# Patient Record
Sex: MALE | Race: WHITE | NOT HISPANIC OR LATINO | Employment: FULL TIME | URBAN - METROPOLITAN AREA
[De-identification: names, ages, dates, MRNs, and addresses within clinical notes are randomized per-mention and may not be internally consistent; named-entity substitution may affect disease eponyms.]

---

## 2017-07-18 ENCOUNTER — OFFICE VISIT (OUTPATIENT)
Dept: URGENT CARE | Facility: CLINIC | Age: 27
End: 2017-07-18
Payer: COMMERCIAL

## 2017-07-18 VITALS
RESPIRATION RATE: 18 BRPM | BODY MASS INDEX: 41.75 KG/M2 | HEIGHT: 73 IN | TEMPERATURE: 98 F | SYSTOLIC BLOOD PRESSURE: 155 MMHG | DIASTOLIC BLOOD PRESSURE: 92 MMHG | WEIGHT: 315 LBS | HEART RATE: 76 BPM | OXYGEN SATURATION: 98 %

## 2017-07-18 DIAGNOSIS — J40 BRONCHITIS: Primary | ICD-10-CM

## 2017-07-18 PROCEDURE — 96372 THER/PROPH/DIAG INJ SC/IM: CPT | Mod: S$GLB,,, | Performed by: EMERGENCY MEDICINE

## 2017-07-18 PROCEDURE — 99203 OFFICE O/P NEW LOW 30 MIN: CPT | Mod: 25,S$GLB,, | Performed by: EMERGENCY MEDICINE

## 2017-07-18 RX ORDER — FLUTICASONE PROPIONATE 50 MCG
1 SPRAY, SUSPENSION (ML) NASAL DAILY
COMMUNITY
End: 2022-07-28

## 2017-07-18 RX ORDER — CODEINE PHOSPHATE AND GUAIFENESIN 10; 100 MG/5ML; MG/5ML
5 SOLUTION ORAL 3 TIMES DAILY PRN
Qty: 240 ML | Refills: 0 | Status: SHIPPED | OUTPATIENT
Start: 2017-07-18 | End: 2017-07-23

## 2017-07-18 RX ORDER — BETAMETHASONE SODIUM PHOSPHATE AND BETAMETHASONE ACETATE 3; 3 MG/ML; MG/ML
6 INJECTION, SUSPENSION INTRA-ARTICULAR; INTRALESIONAL; INTRAMUSCULAR; SOFT TISSUE
Status: COMPLETED | OUTPATIENT
Start: 2017-07-18 | End: 2017-07-18

## 2017-07-18 RX ADMIN — BETAMETHASONE SODIUM PHOSPHATE AND BETAMETHASONE ACETATE 6 MG: 3; 3 INJECTION, SUSPENSION INTRA-ARTICULAR; INTRALESIONAL; INTRAMUSCULAR; SOFT TISSUE at 02:07

## 2017-07-18 NOTE — PROGRESS NOTES
Subjective:       Patient ID: Curtis Moseley is a 26 y.o. male.    Chief Complaint: Cough    PATIENT REPORTS PRODUCTIVE COUGH WITH RED/BLOODY OR GREEN FLUIDS. PATIENT ALSO REPORTS RT SIDED POSTEROLATERAL UPPER BACK PAIN THAT RADIATES TO ANTEROLATERAL UPPER ABD PAIN. PATIENT HAS HX OF BRONCHITIS. PATIENT HAS RECENTLY TRAVELED ON AIRPLANE WITHIN THE [facility]. PATIENT ALSO REPORTS DRINKING AFTER A FRIEND WHO WAS SICK.    3-4 week hx occas mucus prod cough, worse in Am, doing better but states parents concerned, no fever, NOC.  Right chest soreness occas with cough, no rash.      Cough   This is a new problem. The current episode started 1 to 4 weeks ago. The problem occurs constantly. The cough is productive of bloody sputum and productive of brown sputum. Pertinent negatives include no chest pain, chills, ear pain, eye redness, fever, headaches, myalgias, sore throat, shortness of breath or wheezing. The symptoms are aggravated by cold air. Risk factors for lung disease include smoking/tobacco exposure and travel. His past medical history is significant for bronchitis.     Review of Systems   Constitution: Negative for chills, fever and malaise/fatigue.   HENT: Negative for congestion, ear pain, headaches, hoarse voice and sore throat.    Eyes: Negative for discharge and redness.   Cardiovascular: Negative for chest pain, dyspnea on exertion and leg swelling.   Respiratory: Positive for cough and sputum production. Negative for shortness of breath and wheezing.         PATIENT REPORTS SOME BLOODY OR GREEN PRODUCTIVE COUGH SINCE 6/20/17   Musculoskeletal: Positive for back pain. Negative for myalgias.        PAIN TO RIGHT POSTERIOLATERAL UPPER BACK THAT RADIATES TO ANTEROLATERAL ABD.   Gastrointestinal: Positive for abdominal pain. Negative for nausea.        PAIN TO RIGHT POSTERIOLATERAL UPPER BACK THAT RADIATES TO ANTEROLATERAL ABD.       Objective:      Physical Exam   Constitutional: He is oriented to person, place,  and time. He appears well-developed and well-nourished. He is cooperative.  Non-toxic appearance. He does not appear ill. No distress.   HENT:   Head: Normocephalic and atraumatic.   Right Ear: Hearing, tympanic membrane, external ear and ear canal normal.   Left Ear: Hearing, tympanic membrane, external ear and ear canal normal.   Nose: Nose normal. No mucosal edema, rhinorrhea or nasal deformity. No epistaxis. Right sinus exhibits no maxillary sinus tenderness and no frontal sinus tenderness. Left sinus exhibits no maxillary sinus tenderness and no frontal sinus tenderness.   Mouth/Throat: Uvula is midline, oropharynx is clear and moist and mucous membranes are normal. No trismus in the jaw. Normal dentition. No uvula swelling. No posterior oropharyngeal erythema.   Eyes: Conjunctivae and lids are normal. Right eye exhibits no discharge. Left eye exhibits no discharge. No scleral icterus.   Sclera clear bilat   Neck: Trachea normal, normal range of motion, full passive range of motion without pain and phonation normal. Neck supple.   Cardiovascular: Normal rate, regular rhythm, normal heart sounds and normal pulses.    Pulmonary/Chest: Effort normal and breath sounds normal. No respiratory distress.   Chest nontender to palp.   Abdominal: Soft. Normal appearance and bowel sounds are normal. He exhibits no distension, no pulsatile midline mass and no mass. There is no tenderness.   Musculoskeletal: Normal range of motion. He exhibits no edema or deformity.   Neurological: He is alert and oriented to person, place, and time. He exhibits normal muscle tone. Coordination normal.   Skin: Skin is warm, dry and intact. He is not diaphoretic. No pallor.   Psychiatric: He has a normal mood and affect. His speech is normal and behavior is normal. Cognition and memory are normal.   Nursing note and vitals reviewed.      Assessment:       1. Bronchitis        Plan:         Bronchitis  -     betamethasone acetate-betamethasone  sodium phosphate injection 6 mg; Inject 1 mL (6 mg total) into the muscle one time.  -     X-Ray Chest PA And Lateral; Future; Expected date: 07/18/2017  -     guaifenesin-codeine 100-10 mg/5 ml (TUSSI-ORGANIDIN NR)  mg/5 mL syrup; Take 5 mLs by mouth 3 (three) times daily as needed for Cough.  Dispense: 240 mL; Refill: 0      Jhony Peck MD     Radiology Procedure Done: AP & Lat CXR.  Interpretation: No infiltrate/pneumothorax noted

## 2017-07-18 NOTE — PATIENT INSTRUCTIONS
Bronchitis, Viral (Adult)    You have a viral bronchitis. Bronchitis is inflammation and swelling of the lining of the lungs. This is often caused by an infection. Symptoms include a dry, hacking cough that is worse at night. The cough may bring up yellow-green mucus. You may also feel short of breath or wheeze. Other symptoms may include tiredness, chest discomfort, and chills.  Bronchitis that is caused by a virus is not treated with antibiotics. Instead, medicines may be given to help relieve symptoms. Symptoms can last up to 2 weeks, although the cough may last much longer.  This illness is contagious during the first few days and is spread through the air by coughing and sneezing, or by direct contact (touching the sick person and then touching your own eyes, nose, or mouth).  Most viral illnesses resolve within 10 to 14 days with rest and simple home remedies, although they may sometimes last for several weeks.  Home care  · If symptoms are severe, rest at home for the first 2 to 3 days. When you go back to your usual activities, don't let yourself get too tired.  · Do not smoke. Also avoid being exposed to secondhand smoke.  · You may use over-the-counter medicine to control fever or pain, unless another pain medicine was prescribed. (Note: If you have chronic liver or kidney disease or have ever had a stomach ulcer or gastrointestinal bleeding, talk with your healthcare provider before using these medicines. Also talk to your provider if you are taking medicine to prevent blood clots.) Aspirin should never be given to anyone younger than 18 years of age who is ill with a viral infection or fever. It may cause severe liver or brain damage.  · Your appetite may be poor, so a light diet is fine. Avoid dehydration by drinking 6 to 8 glasses of fluids per day (such as water, soft drinks, sports drinks, juices, tea, or soup). Extra fluids will help loosen secretions in the nose and lungs.  · Over-the-counter  cough, cold, and sore-throat medicines will not shorten the length of the illness, but they may help to reduce symptoms. (Note: Do not use decongestants if you have high blood pressure.)  Follow-up care  Follow up with your healthcare provider, or as advised. If you had an X-ray or ECG (electrocardiogram), a specialist will review it. You will be notified of any new findings that may affect your care.  Note: If you are age 65 or older, or if you have a chronic lung disease or condition that affects your immune system, or you smoke, talk to your healthcare provider about having pneumococcal vaccinations and a yearly influenza vaccination (flu shot).  When to seek medical advice  Call your healthcare provider right away if any of these occur:  · Fever of 100.4°F (38°C) or higher  · Coughing up increased amounts of colored sputum  · Weakness, drowsiness, headache, facial pain, ear pain, or a stiff neck  Call 911, or get immediate medical care  Contact emergency services right away if any of these occur:  · Coughing up blood  · Worsening weakness, drowsiness, headache, or stiff neck  · Trouble breathing, wheezing, or pain with breathing  Date Last Reviewed: 9/13/2015  © 7764-2794 [employer]. [address] [place] [address], [place] [address]. All rights reserved. This information is not intended as a substitute for professional medical care. Always follow your healthcare professional's instructions.

## 2019-06-12 ENCOUNTER — OFFICE VISIT (OUTPATIENT)
Dept: OPTOMETRY | Facility: CLINIC | Age: 29
End: 2019-06-12
Payer: COMMERCIAL

## 2019-06-12 DIAGNOSIS — H10.13 ALLERGIC CONJUNCTIVITIS OF BOTH EYES: Primary | ICD-10-CM

## 2019-06-12 DIAGNOSIS — H52.13 MYOPIA OF BOTH EYES WITH ASTIGMATISM: ICD-10-CM

## 2019-06-12 DIAGNOSIS — Z46.0 FITTING AND ADJUSTMENT OF SPECTACLES AND CONTACT LENSES: Primary | ICD-10-CM

## 2019-06-12 DIAGNOSIS — H52.203 MYOPIA OF BOTH EYES WITH ASTIGMATISM: ICD-10-CM

## 2019-06-12 PROCEDURE — 92310 PR CONTACT LENS FITTING (NO CHANGE): ICD-10-PCS | Mod: CSM,,, | Performed by: OPTOMETRIST

## 2019-06-12 PROCEDURE — 99999 PR PBB SHADOW E&M-EST. PATIENT-LVL II: CPT | Mod: PBBFAC,,, | Performed by: OPTOMETRIST

## 2019-06-12 PROCEDURE — 99999 PR PBB SHADOW E&M-EST. PATIENT-LVL II: ICD-10-PCS | Mod: PBBFAC,,, | Performed by: OPTOMETRIST

## 2019-06-12 PROCEDURE — 99499 UNLISTED E&M SERVICE: CPT | Mod: S$GLB,,, | Performed by: OPTOMETRIST

## 2019-06-12 PROCEDURE — 92015 DETERMINE REFRACTIVE STATE: CPT | Mod: S$GLB,,, | Performed by: OPTOMETRIST

## 2019-06-12 PROCEDURE — 92015 PR REFRACTION: ICD-10-PCS | Mod: S$GLB,,, | Performed by: OPTOMETRIST

## 2019-06-12 PROCEDURE — 92004 PR EYE EXAM, NEW PATIENT,COMPREHESV: ICD-10-PCS | Mod: S$GLB,,, | Performed by: OPTOMETRIST

## 2019-06-12 PROCEDURE — 99499 NO LOS: ICD-10-PCS | Mod: S$GLB,,, | Performed by: OPTOMETRIST

## 2019-06-12 PROCEDURE — 92004 COMPRE OPH EXAM NEW PT 1/>: CPT | Mod: S$GLB,,, | Performed by: OPTOMETRIST

## 2019-06-12 PROCEDURE — 92310 CONTACT LENS FITTING OU: CPT | Mod: CSM,,, | Performed by: OPTOMETRIST

## 2019-06-12 NOTE — PROGRESS NOTES
HPI     MAUREEN: 2 years ago    (+)SRx, 2 years old   (+)CLs, thinks the last brand was Acuvue Oasys -- replaces every month   (-)pain, irritation, itching   (-)ocular injuries, surgeries    (-)POHx  (-)FOHx    Last edited by Alix Bolton, OD on 6/12/2019  9:33 AM. (History)            Assessment /Plan     For exam results, see Encounter Report.    Allergic conjunctivitis of both eyes    Myopia of both eyes with astigmatism      1. Educated pt on findings. Recommend OTC zaditor if pt becomes symptomatic. Monitor.   2. Updated SRx. Finalized CL Rx, Acuvue Oasys OD: -3.00, OS: -2.75. Recommend pt to wear CLs for a few days to insure he likes Rx then is okay to order. Monitor yearly.     RTC in 1 year for annual eye exam or prn.

## 2019-09-25 ENCOUNTER — TELEPHONE (OUTPATIENT)
Dept: URGENT CARE | Facility: CLINIC | Age: 29
End: 2019-09-25

## 2019-09-25 ENCOUNTER — OFFICE VISIT (OUTPATIENT)
Dept: URGENT CARE | Facility: CLINIC | Age: 29
End: 2019-09-25
Payer: COMMERCIAL

## 2019-09-25 VITALS
SYSTOLIC BLOOD PRESSURE: 135 MMHG | TEMPERATURE: 97 F | HEIGHT: 73 IN | WEIGHT: 315 LBS | DIASTOLIC BLOOD PRESSURE: 76 MMHG | HEART RATE: 74 BPM | OXYGEN SATURATION: 98 % | BODY MASS INDEX: 41.75 KG/M2 | RESPIRATION RATE: 16 BRPM

## 2019-09-25 DIAGNOSIS — J20.8 ACUTE VIRAL BRONCHITIS: Primary | ICD-10-CM

## 2019-09-25 DIAGNOSIS — R05.9 COUGH: ICD-10-CM

## 2019-09-25 DIAGNOSIS — Z87.891 FORMER SMOKER: ICD-10-CM

## 2019-09-25 DIAGNOSIS — R09.82 POSTNASAL DRIP: ICD-10-CM

## 2019-09-25 PROCEDURE — 71046 XR CHEST PA AND LATERAL: ICD-10-PCS | Mod: S$GLB,,, | Performed by: RADIOLOGY

## 2019-09-25 PROCEDURE — 71046 X-RAY EXAM CHEST 2 VIEWS: CPT | Mod: S$GLB,,, | Performed by: RADIOLOGY

## 2019-09-25 PROCEDURE — 99499 NO LOS: ICD-10-PCS | Mod: S$GLB,,, | Performed by: NURSE PRACTITIONER

## 2019-09-25 PROCEDURE — 99499 UNLISTED E&M SERVICE: CPT | Mod: S$GLB,,, | Performed by: NURSE PRACTITIONER

## 2019-09-25 NOTE — PATIENT INSTRUCTIONS
"Please go directly to [facility] at   Address: [address] [facility], [place] [address]  Hours:   Open ? Closes 8PM        Phone: [phone]    I will call you with results and we will further discuss treatment at that time as needed.                                                           URI   If your condition worsens or fails to improve we recommend that you receive another evaluation at the ER immediately or contact your PCP to discuss your concerns or return here. You must understand that you've received an urgent care treatment only and that you may be released before all your medical problems are known or treated. You the patient will arrange for follouwp care as instructed.   If we discussed that I think your illness is viral, it will not respond to antibiotics and will last 10-14 days.  Flonase (fluticasone) is a nasal spray which is available over the counter and may help with your symptoms.   Zyrtec D, Claritin D or Allegra D can also help with symptoms of congestion and drainage.   If you have hypertension avoid using the "D" which is the decongestant   If you just have drainage you can take plain zyrtec, claritin or allegra   If you just have a congested feeling you can take pseudoephedrine (unless you have high blood pressure) which you have to sign for behind the counter. Do not buy the phenylephrine which is on the shelf as it is not effective   Rest and fluids are also important.   Tylenol or ibuprofen can also be used as directed for pain unless you have an allergy to them or medical condition such as stomach ulcers, kidney or liver disease or blood thinners etc for which you should not be taking these type of medications.   If you are flying in the next few days Afrin nose drops for the airplane flight upon take off and landing may help. Other than at those times refrain from using afrin.             Viral Upper Respiratory Illness (Adult)  You have a viral upper " respiratory illness (URI), which is another term for the common cold. This illness is contagious during the first few days. It is spread through the air by coughing and sneezing. It may also be spread by direct contact (touching the sick person and then touching your own eyes, nose, or mouth). Frequent handwashing will decrease risk of spread. Most viral illnesses go away within 7 to 10 days with rest and simple home remedies. Sometimes the illness may last for several weeks. Antibiotics will not kill a virus, and they are generally not prescribed for this condition.    Home care  · If symptoms are severe, rest at home for the first 2 to 3 days. When you resume activity, don't let yourself get too tired.  · Avoid being exposed to cigarette smoke (yours or others).  · You may use acetaminophen or ibuprofen to control pain and fever, unless another medicine was prescribed. (Note: If you have chronic liver or kidney disease, have ever had a stomach ulcer or gastrointestinal bleeding, or are taking blood-thinning medicines, talk with your healthcare provider before using these medicines.) Aspirin should never be given to anyone under 18 years of age who is ill with a viral infection or fever. It may cause severe liver or brain damage.  · Your appetite may be poor, so a light diet is fine. Avoid dehydration by drinking 6 to 8 glasses of fluids per day (water, soft drinks, juices, tea, or soup). Extra fluids will help loosen secretions in the nose and lungs.  · Over-the-counter cold medicines will not shorten the length of time youre sick, but they may be helpful for the following symptoms: cough, sore throat, and nasal and sinus congestion. (Note: Do not use decongestants if you have high blood pressure.)  Follow-up care  Follow up with your healthcare provider, or as advised.  When to seek medical advice  Call your healthcare provider right away if any of these occur:  · Cough with lots of colored sputum  (mucus)  · Severe headache; face, neck, or ear pain  · Difficulty swallowing due to throat pain  · Fever of 100.4°F (38°C)  Call 911, or get immediate medical care  Call emergency services right away if any of these occur:  · Chest pain, shortness of breath, wheezing, or difficulty breathing  · Coughing up blood  · Inability to swallow due to throat pain  Date Last Reviewed: 9/13/2015  © 5650-5856 [employer]. [address] [place] [address], [place] [address]. All rights reserved. This information is not intended as a substitute for professional medical care. Always follow your healthcare professional's instructions.

## 2019-09-25 NOTE — PROGRESS NOTES
"Subjective:       Patient ID: Curtis Moseley is a 28 y.o. male.    Vitals:    09/25/19 1216   BP: 135/76   Pulse: 74   Resp: 16   Temp: 97.4 °F (36.3 °C)   TempSrc: Tympanic   SpO2: 98%   Weight: (!) 149.7 kg (330 lb)   Height: 6' 1" (1.854 m)       Chief Complaint: Cough      Patient presents with complaint of a post nasal drip with a cough that is productive green mucus expectedly more so at.  States that it does not really bother him during the day.  He did originally have nasal congestion and states that he has some borderline nasal congestion all the time but that is always relieved with Flonase.  He denies any fever or shortness of breath.  He denies any chest pain. He does not have any ear pain or sore throat associated.  Patient states that he knew knee has health insurance.  He also reports that he was previously a vapor smoker but has not smoked for 6 months.    Cough   This is a new problem. The current episode started in the past 7 days. The problem has been unchanged. The problem occurs constantly. The cough is productive of sputum. Associated symptoms include nasal congestion (resolved) and postnasal drip. Pertinent negatives include no chest pain, chills, ear congestion, ear pain, eye redness, fever, headaches, heartburn, hemoptysis, myalgias, rash, rhinorrhea, sore throat, shortness of breath, sweats, weight loss or wheezing. Nothing aggravates the symptoms. Treatments tried: flonase  The treatment provided mild relief. His past medical history is significant for bronchitis and environmental allergies. There is no history of asthma, COPD, emphysema or pneumonia.     Review of Systems   Constitution: Negative for chills, fever, malaise/fatigue and weight loss.   HENT: Positive for congestion and postnasal drip. Negative for ear pain, hoarse voice, rhinorrhea and sore throat.    Eyes: Negative for discharge and redness.   Cardiovascular: Negative for chest pain, dyspnea on exertion and leg swelling. "   Respiratory: Positive for cough and sputum production. Negative for hemoptysis, shortness of breath and wheezing.    Skin: Negative for rash.   Musculoskeletal: Negative for myalgias.   Gastrointestinal: Negative for abdominal pain, heartburn and nausea.   Neurological: Negative for headaches.   Allergic/Immunologic: Positive for environmental allergies.       Objective:      Physical Exam   Constitutional: He is oriented to person, place, and time. He appears well-developed and well-nourished. He is cooperative.  Non-toxic appearance. He does not have a sickly appearance. He does not appear ill. No distress.   HENT:   Head: Normocephalic and atraumatic.   Right Ear: Hearing, tympanic membrane, external ear and ear canal normal.   Left Ear: Hearing, tympanic membrane, external ear and ear canal normal.   Nose: Nose normal. No mucosal edema, rhinorrhea or nasal deformity. No epistaxis. Right sinus exhibits no maxillary sinus tenderness and no frontal sinus tenderness. Left sinus exhibits no maxillary sinus tenderness and no frontal sinus tenderness.   Mouth/Throat: Uvula is midline, oropharynx is clear and moist and mucous membranes are normal. No trismus in the jaw. Normal dentition. No uvula swelling. No oropharyngeal exudate, posterior oropharyngeal edema or posterior oropharyngeal erythema.   Eyes: Conjunctivae and lids are normal. No scleral icterus.   Sclera clear bilat   Neck: Trachea normal, full passive range of motion without pain and phonation normal. Neck supple.   Cardiovascular: Normal rate, regular rhythm, normal heart sounds, intact distal pulses and normal pulses.   Pulmonary/Chest: Effort normal and breath sounds normal. No respiratory distress. He has no wheezes.   Abdominal: Soft. Normal appearance and bowel sounds are normal. He exhibits no distension. There is no tenderness.   Musculoskeletal: Normal range of motion. He exhibits no edema or deformity.   Lymphadenopathy:     He has no cervical  adenopathy.   Neurological: He is alert and oriented to person, place, and time. He exhibits normal muscle tone. Coordination normal.   Skin: Skin is warm, dry and intact. He is not diaphoretic. No pallor.   Psychiatric: He has a normal mood and affect. His speech is normal and behavior is normal. Judgment and thought content normal. Cognition and memory are normal.   Nursing note and vitals reviewed.      X-ray Chest Pa And Lateral    Result Date: 9/25/2019  EXAMINATION: XR CHEST PA AND LATERAL CLINICAL HISTORY: Cough TECHNIQUE: PA and lateral views of the chest were performed. COMPARISON: Prior study dated 18 July 2017 FINDINGS: The trachea is unchanged in position there is stable unremarkable radiographic appearance of the cardiomediastinal shadow.  Both hilar regions are also stable in appearance with no hilar enlargement or radiographic evidence of pulmonary edema demonstrated. Both lungs are fully expanded and appear clear.  The hemidiaphragms are sharply outlined.  The costophrenic angles are acute with no pleural effusion demonstrated.  There is stable appearance of the included osseous structures.     Stable two-view appearance of the chest without evidence of acute pulmonary process. Electronically signed by: Roger Schroeder MD Date:    09/25/2019 Time:    13:17  Assessment:       1. Acute viral bronchitis    2. Cough    3. Former smoker    4. Postnasal drip        Plan:       Curtis was seen today for cough.    Diagnoses and all orders for this visit:    Acute viral bronchitis    Cough  -     X-Ray Chest PA And Lateral; Future    Former smoker  -     X-Ray Chest PA And Lateral; Future    Postnasal drip      Patient Instructions   Please go directly to [facility] at   Address: [facility] [address] [facility], [place] [address]  Hours:   Open ? Closes 8PM        Phone: [phone]    I will call you with results and we will further discuss treatment at that time as needed.                   "                                         URI   If your condition worsens or fails to improve we recommend that you receive another evaluation at the ER immediately or contact your PCP to discuss your concerns or return here. You must understand that you've received an urgent care treatment only and that you may be released before all your medical problems are known or treated. You the patient will arrange for follouwp care as instructed.   If we discussed that I think your illness is viral, it will not respond to antibiotics and will last 10-14 days.  Flonase (fluticasone) is a nasal spray which is available over the counter and may help with your symptoms.   Zyrtec D, Claritin D or Allegra D can also help with symptoms of congestion and drainage.   If you have hypertension avoid using the "D" which is the decongestant   If you just have drainage you can take plain zyrtec, claritin or allegra   If you just have a congested feeling you can take pseudoephedrine (unless you have high blood pressure) which you have to sign for behind the counter. Do not buy the phenylephrine which is on the shelf as it is not effective   Rest and fluids are also important.   Tylenol or ibuprofen can also be used as directed for pain unless you have an allergy to them or medical condition such as stomach ulcers, kidney or liver disease or blood thinners etc for which you should not be taking these type of medications.   If you are flying in the next few days Afrin nose drops for the airplane flight upon take off and landing may help. Other than at those times refrain from using afrin.             Viral Upper Respiratory Illness (Adult)  You have a viral upper respiratory illness (URI), which is another term for the common cold. This illness is contagious during the first few days. It is spread through the air by coughing and sneezing. It may also be spread by direct contact (touching the sick person and then touching your own eyes, nose, " or mouth). Frequent handwashing will decrease risk of spread. Most viral illnesses go away within 7 to 10 days with rest and simple home remedies. Sometimes the illness may last for several weeks. Antibiotics will not kill a virus, and they are generally not prescribed for this condition.    Home care  · If symptoms are severe, rest at home for the first 2 to 3 days. When you resume activity, don't let yourself get too tired.  · Avoid being exposed to cigarette smoke (yours or others).  · You may use acetaminophen or ibuprofen to control pain and fever, unless another medicine was prescribed. (Note: If you have chronic liver or kidney disease, have ever had a stomach ulcer or gastrointestinal bleeding, or are taking blood-thinning medicines, talk with your healthcare provider before using these medicines.) Aspirin should never be given to anyone under 18 years of age who is ill with a viral infection or fever. It may cause severe liver or brain damage.  · Your appetite may be poor, so a light diet is fine. Avoid dehydration by drinking 6 to 8 glasses of fluids per day (water, soft drinks, juices, tea, or soup). Extra fluids will help loosen secretions in the nose and lungs.  · Over-the-counter cold medicines will not shorten the length of time youre sick, but they may be helpful for the following symptoms: cough, sore throat, and nasal and sinus congestion. (Note: Do not use decongestants if you have high blood pressure.)  Follow-up care  Follow up with your healthcare provider, or as advised.  When to seek medical advice  Call your healthcare provider right away if any of these occur:  · Cough with lots of colored sputum (mucus)  · Severe headache; face, neck, or ear pain  · Difficulty swallowing due to throat pain  · Fever of 100.4°F (38°C)  Call 911, or get immediate medical care  Call emergency services right away if any of these occur:  · Chest pain, shortness of breath, wheezing, or difficulty  breathing  · Coughing up blood  · Inability to swallow due to throat pain  Date Last Reviewed: 9/13/2015  © 1644-0972 [employer]. [address] [place] [address], [place] [address]. All rights reserved. This information is not intended as a substitute for professional medical care. Always follow your healthcare professional's instructions.

## 2019-09-25 NOTE — TELEPHONE ENCOUNTER
I advised the patient on negative chest x-ray with viral bronchitis URI.  Continue with plan of care of Flonase, Zyrtec D and Mucinex DM.  He was advised to follow up closely if he has any worsening symptoms, symptoms past 2-3 weeks, fever, shortness of breath, chest pain.

## 2019-11-10 DIAGNOSIS — R21 RASH AND NONSPECIFIC SKIN ERUPTION: Primary | ICD-10-CM

## 2019-11-10 RX ORDER — CLOTRIMAZOLE AND BETAMETHASONE DIPROPIONATE 10; .64 MG/G; MG/G
CREAM TOPICAL 2 TIMES DAILY
Qty: 45 G | Refills: 1 | Status: SHIPPED | OUTPATIENT
Start: 2019-11-10 | End: 2019-11-20

## 2020-11-05 ENCOUNTER — PATIENT MESSAGE (OUTPATIENT)
Dept: INTERNAL MEDICINE | Facility: CLINIC | Age: 30
End: 2020-11-05

## 2020-11-05 ENCOUNTER — LAB VISIT (OUTPATIENT)
Dept: LAB | Facility: OTHER | Age: 30
End: 2020-11-05
Attending: INTERNAL MEDICINE
Payer: COMMERCIAL

## 2020-11-05 ENCOUNTER — OFFICE VISIT (OUTPATIENT)
Dept: INTERNAL MEDICINE | Facility: CLINIC | Age: 30
End: 2020-11-05
Attending: INTERNAL MEDICINE
Payer: COMMERCIAL

## 2020-11-05 VITALS
HEIGHT: 73 IN | DIASTOLIC BLOOD PRESSURE: 78 MMHG | SYSTOLIC BLOOD PRESSURE: 118 MMHG | HEART RATE: 62 BPM | BODY MASS INDEX: 41.75 KG/M2 | OXYGEN SATURATION: 97 % | WEIGHT: 315 LBS

## 2020-11-05 DIAGNOSIS — Z00.00 ROUTINE ADULT HEALTH MAINTENANCE: ICD-10-CM

## 2020-11-05 DIAGNOSIS — J34.89 RHINORRHEA: ICD-10-CM

## 2020-11-05 DIAGNOSIS — E55.9 VITAMIN D DEFICIENCY: ICD-10-CM

## 2020-11-05 DIAGNOSIS — R09.81 NASAL CONGESTION: ICD-10-CM

## 2020-11-05 DIAGNOSIS — E78.9 DISORDER OF LIPID METABOLISM: ICD-10-CM

## 2020-11-05 DIAGNOSIS — R79.89 OTHER SPECIFIED ABNORMAL FINDINGS OF BLOOD CHEMISTRY: ICD-10-CM

## 2020-11-05 DIAGNOSIS — E03.9 HYPOTHYROIDISM, UNSPECIFIED TYPE: Primary | ICD-10-CM

## 2020-11-05 DIAGNOSIS — J30.1 NON-SEASONAL ALLERGIC RHINITIS DUE TO POLLEN: ICD-10-CM

## 2020-11-05 DIAGNOSIS — Z13.31 SCREENING FOR DEPRESSION: ICD-10-CM

## 2020-11-05 DIAGNOSIS — D51.0 PERNICIOUS ANEMIA: ICD-10-CM

## 2020-11-05 DIAGNOSIS — Z13.89 SCREENING FOR OBESITY: ICD-10-CM

## 2020-11-05 DIAGNOSIS — E03.9 HYPOTHYROIDISM, UNSPECIFIED TYPE: ICD-10-CM

## 2020-11-05 DIAGNOSIS — R09.82 PND (POST-NASAL DRIP): ICD-10-CM

## 2020-11-05 DIAGNOSIS — Z13.39 SCREENING FOR ALCOHOLISM: ICD-10-CM

## 2020-11-05 DIAGNOSIS — D50.8 OTHER IRON DEFICIENCY ANEMIA: ICD-10-CM

## 2020-11-05 DIAGNOSIS — Z12.5 SCREENING FOR PROSTATE CANCER: ICD-10-CM

## 2020-11-05 DIAGNOSIS — Z00.00 ROUTINE ADULT HEALTH MAINTENANCE: Primary | ICD-10-CM

## 2020-11-05 DIAGNOSIS — U07.1 COVID-19: ICD-10-CM

## 2020-11-05 LAB
25(OH)D3+25(OH)D2 SERPL-MCNC: 14 NG/ML (ref 30–96)
ALBUMIN SERPL BCP-MCNC: 4.2 G/DL (ref 3.5–5.2)
ALP SERPL-CCNC: 57 U/L (ref 55–135)
ALT SERPL W/O P-5'-P-CCNC: 55 U/L (ref 10–44)
ANION GAP SERPL CALC-SCNC: 12 MMOL/L (ref 8–16)
AST SERPL-CCNC: 28 U/L (ref 10–40)
BASOPHILS # BLD AUTO: 0.03 K/UL (ref 0–0.2)
BASOPHILS NFR BLD: 0.4 % (ref 0–1.9)
BILIRUB SERPL-MCNC: 0.7 MG/DL (ref 0.1–1)
BUN SERPL-MCNC: 12 MG/DL (ref 6–20)
CALCIUM SERPL-MCNC: 9 MG/DL (ref 8.7–10.5)
CHLORIDE SERPL-SCNC: 103 MMOL/L (ref 95–110)
CHOLEST SERPL-MCNC: 161 MG/DL (ref 120–199)
CHOLEST/HDLC SERPL: 4.1 {RATIO} (ref 2–5)
CO2 SERPL-SCNC: 24 MMOL/L (ref 23–29)
CREAT SERPL-MCNC: 1.2 MG/DL (ref 0.5–1.4)
DIFFERENTIAL METHOD: NORMAL
EOSINOPHIL # BLD AUTO: 0.1 K/UL (ref 0–0.5)
EOSINOPHIL NFR BLD: 1.4 % (ref 0–8)
ERYTHROCYTE [DISTWIDTH] IN BLOOD BY AUTOMATED COUNT: 12.4 % (ref 11.5–14.5)
EST. GFR  (AFRICAN AMERICAN): >60 ML/MIN/1.73 M^2
EST. GFR  (NON AFRICAN AMERICAN): >60 ML/MIN/1.73 M^2
GLUCOSE SERPL-MCNC: 89 MG/DL (ref 70–110)
HCT VFR BLD AUTO: 47.9 % (ref 40–54)
HDLC SERPL-MCNC: 39 MG/DL (ref 40–75)
HDLC SERPL: 24.2 % (ref 20–50)
HGB BLD-MCNC: 16.8 G/DL (ref 14–18)
IMM GRANULOCYTES # BLD AUTO: 0.01 K/UL (ref 0–0.04)
IMM GRANULOCYTES NFR BLD AUTO: 0.1 % (ref 0–0.5)
LDLC SERPL CALC-MCNC: 95.6 MG/DL (ref 63–159)
LYMPHOCYTES # BLD AUTO: 1.9 K/UL (ref 1–4.8)
LYMPHOCYTES NFR BLD: 27.7 % (ref 18–48)
MCH RBC QN AUTO: 30.2 PG (ref 27–31)
MCHC RBC AUTO-ENTMCNC: 35.1 G/DL (ref 32–36)
MCV RBC AUTO: 86 FL (ref 82–98)
MONOCYTES # BLD AUTO: 0.6 K/UL (ref 0.3–1)
MONOCYTES NFR BLD: 9.3 % (ref 4–15)
NEUTROPHILS # BLD AUTO: 4.2 K/UL (ref 1.8–7.7)
NEUTROPHILS NFR BLD: 61.1 % (ref 38–73)
NONHDLC SERPL-MCNC: 122 MG/DL
NRBC BLD-RTO: 0 /100 WBC
PLATELET # BLD AUTO: 216 K/UL (ref 150–350)
PMV BLD AUTO: 11.4 FL (ref 9.2–12.9)
POTASSIUM SERPL-SCNC: 4 MMOL/L (ref 3.5–5.1)
PROT SERPL-MCNC: 7.2 G/DL (ref 6–8.4)
RBC # BLD AUTO: 5.56 M/UL (ref 4.6–6.2)
SODIUM SERPL-SCNC: 139 MMOL/L (ref 136–145)
T4 FREE SERPL-MCNC: 1.07 NG/DL (ref 0.71–1.51)
TRIGL SERPL-MCNC: 132 MG/DL (ref 30–150)
TSH SERPL DL<=0.005 MIU/L-ACNC: 1.67 UIU/ML (ref 0.4–4)
VIT B12 SERPL-MCNC: 304 PG/ML (ref 210–950)
WBC # BLD AUTO: 6.9 K/UL (ref 3.9–12.7)

## 2020-11-05 PROCEDURE — 90715 TDAP VACCINE GREATER THAN OR EQUAL TO 7YO IM: ICD-10-PCS | Mod: S$GLB,,, | Performed by: INTERNAL MEDICINE

## 2020-11-05 PROCEDURE — 83036 HEMOGLOBIN GLYCOSYLATED A1C: CPT

## 2020-11-05 PROCEDURE — 82607 VITAMIN B-12: CPT

## 2020-11-05 PROCEDURE — 84439 ASSAY OF FREE THYROXINE: CPT

## 2020-11-05 PROCEDURE — 86703 HIV-1/HIV-2 1 RESULT ANTBDY: CPT

## 2020-11-05 PROCEDURE — 85025 COMPLETE CBC W/AUTO DIFF WBC: CPT

## 2020-11-05 PROCEDURE — 90715 TDAP VACCINE 7 YRS/> IM: CPT | Mod: S$GLB,,, | Performed by: INTERNAL MEDICINE

## 2020-11-05 PROCEDURE — 80061 LIPID PANEL: CPT

## 2020-11-05 PROCEDURE — 86803 HEPATITIS C AB TEST: CPT

## 2020-11-05 PROCEDURE — 99385 PR PREVENTIVE VISIT,NEW,18-39: ICD-10-PCS | Mod: 25,S$GLB,, | Performed by: INTERNAL MEDICINE

## 2020-11-05 PROCEDURE — 86769 SARS-COV-2 COVID-19 ANTIBODY: CPT

## 2020-11-05 PROCEDURE — 90471 IMMUNIZATION ADMIN: CPT | Mod: S$GLB,,, | Performed by: INTERNAL MEDICINE

## 2020-11-05 PROCEDURE — 99385 PREV VISIT NEW AGE 18-39: CPT | Mod: 25,S$GLB,, | Performed by: INTERNAL MEDICINE

## 2020-11-05 PROCEDURE — 80053 COMPREHEN METABOLIC PANEL: CPT

## 2020-11-05 PROCEDURE — 84443 ASSAY THYROID STIM HORMONE: CPT

## 2020-11-05 PROCEDURE — 82306 VITAMIN D 25 HYDROXY: CPT

## 2020-11-05 PROCEDURE — 90471 TDAP VACCINE GREATER THAN OR EQUAL TO 7YO IM: ICD-10-PCS | Mod: S$GLB,,, | Performed by: INTERNAL MEDICINE

## 2020-11-05 RX ORDER — LEVOCETIRIZINE DIHYDROCHLORIDE 5 MG/1
5 TABLET, FILM COATED ORAL NIGHTLY PRN
COMMUNITY

## 2020-11-05 RX ORDER — MONTELUKAST SODIUM 10 MG/1
10 TABLET ORAL NIGHTLY
Qty: 30 TABLET | Refills: 11 | Status: SHIPPED | OUTPATIENT
Start: 2020-11-05 | End: 2020-12-05

## 2020-11-05 NOTE — PATIENT INSTRUCTIONS
Tips for Healthy Living and Routine Preventative Care - 2020                                                            (These guidelines are intended for healthy adults)      1. Exercise  Exercise aerobically with a target heart rate of (220-age) x 0.8  Exercise 30-45 minutes on most days of the week    2. Diet and Supplements- All supplements can be obtained through a varied, healthy diet   Calcium: 1,000 - 1,200 mg each day   8 oz milk, Calcium fortified O.J., or Yogurt = 300 mg, 1oz of cheese =100-200 mg  Vitamin D: 800 iu each day- Can probably be obtained by 30 min. of direct sunlight    each day             3 oz. [place] = 800 iu,  3 oz. Tuna =150 iu, Milk or fortified O.J. = 120 iu  Fish oil: 1-2 grams each day or about 840 mg of EPA and DHA (Omega-3 fatty acids) each day             3 oz. [place]=2 grams,  3 oz. Tuna=1.3 grams,  3 oz. drained light Tuna= 0.25 grams  Folic acid 800 mcg each day for all women planning or capable of pregnancy    3. Lifestyle  Alcohol: 1 drink = 12 oz. domestic beer, 4 oz. wine, or 1 oz. hard (80 proof) liquor             Males: </= 14 drinks per week with no more than 4 in any one day             Females: </= 7 drinks per week with no more than 3 in any one day  Salt: 1.2 - 3 grams of Sodium each day.  Tobacco: Dont smoke, or quit smoking (discuss with your doctor)  Depression: If you feel depressed discuss with your doctor  Weight: Maintain a healthy body weight. Stay within 10% of:             Males: 106 lbs. + 6 lbs per inch height above 5 feet             Females: 100 lbs + 5 lbs per inch height above 5 feet    4. Routine tests  Blood pressure check at each visit, or at least once each year  HIV screening (one time) if less than 65 years old  Hepatitis C screen (one time) between the age of 18 and 79  Cholesterol screening every 3 years starting at age 21  Glucose check every 2-3 years starting at age 45  TSH (thyroid screen) every 2 years starting at age 50  Colonoscopy at  age 50, and repeat every 10 years until age 75  Vision screen at age 65    Females:   Gyn exam with cervical HPV test every 3 years or Pap smear every three years starting at age 25                  Stop screening at age 65 if past 3 exams were normal                  No screening for women who have had a hysterectomy with removal of cervix  Mammogram every 1-2 years starting at age 40 (or age 50) until age 75.  Bone density scan at about age 65      Males:  Consider PSA screening annually at age 50, age 45 for  Americans, until age 75                 5. Immunizations  Influenza vaccine every year in the fall, especially if >50 or with a chronic disease  Tetnus/Diptheria/Pertusis (Tdap) vaccine once (after the age of 18), then Tetnus/Diptheria (Td) vaccine every 10 years  Shingles (Shingrix) vaccine after age 50 and get a 2nd dose after 2-6 months  Pneumonia vaccine at age 65. 2nd Pneumonia vaccine at least 1 year later (1st should be Prevnar-13, and 2nd should be Pneumovax-23).

## 2020-11-06 PROBLEM — J30.1 NON-SEASONAL ALLERGIC RHINITIS DUE TO POLLEN: Status: ACTIVE | Noted: 2020-11-06

## 2020-11-06 PROBLEM — J40 BRONCHITIS: Status: RESOLVED | Noted: 2017-07-18 | Resolved: 2020-11-06

## 2020-11-06 LAB
ESTIMATED AVG GLUCOSE: 100 MG/DL (ref 68–131)
HBA1C MFR BLD HPLC: 5.1 % (ref 4–5.6)
HCV AB SERPL QL IA: NEGATIVE
HIV 1+2 AB+HIV1 P24 AG SERPL QL IA: NEGATIVE
SARS-COV-2 IGG SERPLBLD QL IA.RAPID: NEGATIVE

## 2020-11-06 NOTE — PROGRESS NOTES
Subjective:       Patient ID: Curtis Moseley is a 29 y.o. male.    Chief Complaint: Annual Exam and Sinus Problem (PND)    Jorge Alberto ross.  Last seen over 5 years ago.  He is living in the [facility] again and working at [employer].  He has problems with a chronic throaty cough.  He does feel like there is some nasal drip.  He denies any GERD.    Sinus Problem  This is a chronic problem. The current episode started more than 1 month ago. The problem is unchanged. There has been no fever. Associated symptoms include congestion.     Review of Systems   Constitutional: Negative.    HENT: Positive for nasal congestion, postnasal drip and rhinorrhea.    Eyes: Negative.    Respiratory: Negative.    Cardiovascular: Negative.    Gastrointestinal: Negative.    Musculoskeletal: Negative.    Neurological: Negative.    Psychiatric/Behavioral: Negative for dysphoric mood.         Objective:      Physical Exam  Vitals signs and nursing note reviewed.   Constitutional:       General: He is not in acute distress.     Appearance: He is well-developed. He is not diaphoretic.   HENT:      Head: Normocephalic.      Right Ear: Ear canal and external ear normal.      Left Ear: Ear canal and external ear normal.      Nose: Nose normal.      Mouth/Throat:      Pharynx: No oropharyngeal exudate.   Eyes:      General: No scleral icterus.        Right eye: No discharge.         Left eye: No discharge.      Conjunctiva/sclera: Conjunctivae normal.      Pupils: Pupils are equal, round, and reactive to light.   Neck:      Musculoskeletal: Normal range of motion and neck supple.      Thyroid: No thyromegaly.      Vascular: No JVD.   Cardiovascular:      Rate and Rhythm: Normal rate and regular rhythm.      Heart sounds: Normal heart sounds. No murmur. No friction rub. No gallop.    Pulmonary:      Effort: Pulmonary effort is normal. No respiratory distress.      Breath sounds: Normal breath sounds. No stridor. No wheezing or rales.   Chest:       Chest wall: No tenderness.   Abdominal:      General: Bowel sounds are normal. There is no distension.      Palpations: Abdomen is soft. There is no mass.      Tenderness: There is no abdominal tenderness. There is no guarding or rebound.   Musculoskeletal: Normal range of motion.         General: No tenderness.   Lymphadenopathy:      Cervical: No cervical adenopathy.   Skin:     General: Skin is warm and dry.      Findings: No rash.   Neurological:      Mental Status: He is alert and oriented to person, place, and time.      Cranial Nerves: No cranial nerve deficit.      Coordination: Coordination normal.      Deep Tendon Reflexes: Reflexes are normal and symmetric. Reflexes normal.   Psychiatric:         Behavior: Behavior normal.         Assessment:       1. Routine adult health maintenance    2. Rhinorrhea    3. PND (post-nasal drip)    4. Nasal congestion    5. Non-seasonal allergic rhinitis due to pollen    6. Screening for alcoholism    7. Screening for obesity    8. Screening for depression        Plan:       Per orders and D/C instructions.     he will try singular log are and or Xyzal for his allergy symptoms.  Check labs.    Screening: The patient was screened for depression with the PHQ2 questionnaire and possible health consequences were discussed with the patient, who understands (15 minutes spent). The patient was screened for the misuse of alcohol, by asking the number of drinks per average week, and if pt has had more than 4 drinks (more than 3 for women and elderly) in 1 day within the past year. The health and legal consequences of misuse were discussed (15 minutes spent). The patient was screened for obesity (BMI>30), If the current BMI > 30, then the possible consequences of obesity, as well as the benefits of diet, exercise, and weight loss were discussed. Any behavioral risks were identified, and methods to achieve appropriate treatment goals were discussed (15 minutes spent).

## 2020-12-15 ENCOUNTER — IMMUNIZATION (OUTPATIENT)
Dept: INTERNAL MEDICINE | Facility: CLINIC | Age: 30
End: 2020-12-15
Payer: COMMERCIAL

## 2020-12-15 DIAGNOSIS — Z23 NEED FOR VACCINATION: ICD-10-CM

## 2020-12-15 PROCEDURE — 91300 COVID-19, MRNA, LNP-S, PF, 30 MCG/0.3 ML DOSE VACCINE: CPT | Mod: ,,, | Performed by: INTERNAL MEDICINE

## 2020-12-15 PROCEDURE — 91300 COVID-19, MRNA, LNP-S, PF, 30 MCG/0.3 ML DOSE VACCINE: ICD-10-PCS | Mod: ,,, | Performed by: INTERNAL MEDICINE

## 2020-12-15 PROCEDURE — 0001A COVID-19, MRNA, LNP-S, PF, 30 MCG/0.3 ML DOSE VACCINE: ICD-10-PCS | Mod: CV19,,, | Performed by: INTERNAL MEDICINE

## 2020-12-15 PROCEDURE — 0001A COVID-19, MRNA, LNP-S, PF, 30 MCG/0.3 ML DOSE VACCINE: CPT | Mod: CV19,,, | Performed by: INTERNAL MEDICINE

## 2021-01-05 ENCOUNTER — IMMUNIZATION (OUTPATIENT)
Dept: INTERNAL MEDICINE | Facility: CLINIC | Age: 31
End: 2021-01-05
Payer: COMMERCIAL

## 2021-01-05 DIAGNOSIS — Z23 NEED FOR VACCINATION: ICD-10-CM

## 2021-01-05 PROCEDURE — 91300 COVID-19, MRNA, LNP-S, PF, 30 MCG/0.3 ML DOSE VACCINE: CPT | Mod: PBBFAC | Performed by: INTERNAL MEDICINE

## 2021-01-05 PROCEDURE — 0002A COVID-19, MRNA, LNP-S, PF, 30 MCG/0.3 ML DOSE VACCINE: CPT | Mod: PBBFAC | Performed by: INTERNAL MEDICINE

## 2021-05-05 ENCOUNTER — OFFICE VISIT (OUTPATIENT)
Dept: INTERNAL MEDICINE | Facility: CLINIC | Age: 31
End: 2021-05-05
Attending: INTERNAL MEDICINE
Payer: COMMERCIAL

## 2021-05-05 ENCOUNTER — PATIENT MESSAGE (OUTPATIENT)
Dept: INTERNAL MEDICINE | Facility: CLINIC | Age: 31
End: 2021-05-05

## 2021-05-05 VITALS
SYSTOLIC BLOOD PRESSURE: 120 MMHG | DIASTOLIC BLOOD PRESSURE: 80 MMHG | OXYGEN SATURATION: 97 % | HEART RATE: 73 BPM | WEIGHT: 315 LBS | BODY MASS INDEX: 41.75 KG/M2 | HEIGHT: 73 IN

## 2021-05-05 DIAGNOSIS — R05.9 COUGH: ICD-10-CM

## 2021-05-05 DIAGNOSIS — E66.01 CLASS 3 SEVERE OBESITY DUE TO EXCESS CALORIES WITHOUT SERIOUS COMORBIDITY WITH BODY MASS INDEX (BMI) OF 45.0 TO 49.9 IN ADULT: ICD-10-CM

## 2021-05-05 DIAGNOSIS — Z13.89 SCREENING FOR OBESITY: ICD-10-CM

## 2021-05-05 DIAGNOSIS — R74.01 ELEVATED TRANSAMINASE LEVEL: ICD-10-CM

## 2021-05-05 DIAGNOSIS — Z13.31 SCREENING FOR DEPRESSION: ICD-10-CM

## 2021-05-05 DIAGNOSIS — E55.9 VITAMIN D DEFICIENCY: ICD-10-CM

## 2021-05-05 DIAGNOSIS — J30.1 NON-SEASONAL ALLERGIC RHINITIS DUE TO POLLEN: Primary | ICD-10-CM

## 2021-05-05 DIAGNOSIS — Z13.39 SCREENING FOR ALCOHOLISM: ICD-10-CM

## 2021-05-05 PROBLEM — E66.813 CLASS 3 SEVERE OBESITY DUE TO EXCESS CALORIES WITHOUT SERIOUS COMORBIDITY WITH BODY MASS INDEX (BMI) OF 45.0 TO 49.9 IN ADULT: Status: ACTIVE | Noted: 2021-05-05

## 2021-05-05 PROCEDURE — G0442 PR  ALCOHOL SCREENING: ICD-10-PCS | Mod: 59,S$GLB,, | Performed by: INTERNAL MEDICINE

## 2021-05-05 PROCEDURE — G0447 PR OBESITY COUNSELING: ICD-10-PCS | Mod: 59,S$GLB,, | Performed by: INTERNAL MEDICINE

## 2021-05-05 PROCEDURE — G0442 ANNUAL ALCOHOL SCREEN 15 MIN: HCPCS | Mod: 59,S$GLB,, | Performed by: INTERNAL MEDICINE

## 2021-05-05 PROCEDURE — 99214 OFFICE O/P EST MOD 30 MIN: CPT | Mod: S$GLB,,, | Performed by: INTERNAL MEDICINE

## 2021-05-05 PROCEDURE — G0447 BEHAVIOR COUNSEL OBESITY 15M: HCPCS | Mod: 59,S$GLB,, | Performed by: INTERNAL MEDICINE

## 2021-05-05 PROCEDURE — 3008F BODY MASS INDEX DOCD: CPT | Mod: CPTII,S$GLB,, | Performed by: INTERNAL MEDICINE

## 2021-05-05 PROCEDURE — 3008F PR BODY MASS INDEX (BMI) DOCUMENTED: ICD-10-PCS | Mod: CPTII,S$GLB,, | Performed by: INTERNAL MEDICINE

## 2021-05-05 PROCEDURE — G0444 PR DEPRESSION SCREENING: ICD-10-PCS | Mod: 59,S$GLB,, | Performed by: INTERNAL MEDICINE

## 2021-05-05 PROCEDURE — 99214 PR OFFICE/OUTPT VISIT, EST, LEVL IV, 30-39 MIN: ICD-10-PCS | Mod: S$GLB,,, | Performed by: INTERNAL MEDICINE

## 2021-05-05 PROCEDURE — G0444 DEPRESSION SCREEN ANNUAL: HCPCS | Mod: 59,S$GLB,, | Performed by: INTERNAL MEDICINE

## 2021-05-05 RX ORDER — VIT C/E/ZN/COPPR/LUTEIN/ZEAXAN 250MG-90MG
2000 CAPSULE ORAL DAILY
COMMUNITY

## 2021-05-05 RX ORDER — AZELASTINE 1 MG/ML
2 SPRAY, METERED NASAL 2 TIMES DAILY PRN
Qty: 30 ML | Refills: 5 | Status: SHIPPED | OUTPATIENT
Start: 2021-05-05 | End: 2022-07-28

## 2021-11-10 ENCOUNTER — LAB VISIT (OUTPATIENT)
Dept: LAB | Facility: OTHER | Age: 31
End: 2021-11-10
Attending: INTERNAL MEDICINE
Payer: COMMERCIAL

## 2021-11-10 ENCOUNTER — OFFICE VISIT (OUTPATIENT)
Dept: INTERNAL MEDICINE | Facility: CLINIC | Age: 31
End: 2021-11-10
Attending: INTERNAL MEDICINE
Payer: COMMERCIAL

## 2021-11-10 VITALS
SYSTOLIC BLOOD PRESSURE: 122 MMHG | DIASTOLIC BLOOD PRESSURE: 78 MMHG | WEIGHT: 315 LBS | HEART RATE: 93 BPM | OXYGEN SATURATION: 97 % | HEIGHT: 73 IN | BODY MASS INDEX: 41.75 KG/M2

## 2021-11-10 DIAGNOSIS — E78.9 DISORDER OF LIPID METABOLISM: ICD-10-CM

## 2021-11-10 DIAGNOSIS — R79.89 OTHER SPECIFIED ABNORMAL FINDINGS OF BLOOD CHEMISTRY: ICD-10-CM

## 2021-11-10 DIAGNOSIS — E66.01 CLASS 3 SEVERE OBESITY DUE TO EXCESS CALORIES WITHOUT SERIOUS COMORBIDITY WITH BODY MASS INDEX (BMI) OF 45.0 TO 49.9 IN ADULT: Primary | ICD-10-CM

## 2021-11-10 DIAGNOSIS — E03.9 HYPOTHYROIDISM, UNSPECIFIED TYPE: ICD-10-CM

## 2021-11-10 DIAGNOSIS — Z12.5 SCREENING FOR PROSTATE CANCER: ICD-10-CM

## 2021-11-10 DIAGNOSIS — D51.0 PERNICIOUS ANEMIA: ICD-10-CM

## 2021-11-10 DIAGNOSIS — Z00.00 ROUTINE ADULT HEALTH MAINTENANCE: Primary | ICD-10-CM

## 2021-11-10 DIAGNOSIS — D50.8 OTHER IRON DEFICIENCY ANEMIA: ICD-10-CM

## 2021-11-10 DIAGNOSIS — E55.9 VITAMIN D DEFICIENCY: ICD-10-CM

## 2021-11-10 DIAGNOSIS — R74.01 ELEVATED TRANSAMINASE LEVEL: ICD-10-CM

## 2021-11-10 DIAGNOSIS — Z00.00 ROUTINE ADULT HEALTH MAINTENANCE: ICD-10-CM

## 2021-11-10 LAB
25(OH)D3+25(OH)D2 SERPL-MCNC: 21 NG/ML (ref 30–96)
ALBUMIN SERPL BCP-MCNC: 3.7 G/DL (ref 3.5–5.2)
ALP SERPL-CCNC: 71 U/L (ref 55–135)
ALT SERPL W/O P-5'-P-CCNC: 53 U/L (ref 10–44)
ANION GAP SERPL CALC-SCNC: 9 MMOL/L (ref 8–16)
AST SERPL-CCNC: 31 U/L (ref 10–40)
BASOPHILS # BLD AUTO: 0.02 K/UL (ref 0–0.2)
BASOPHILS NFR BLD: 0.3 % (ref 0–1.9)
BILIRUB SERPL-MCNC: 0.5 MG/DL (ref 0.1–1)
BUN SERPL-MCNC: 14 MG/DL (ref 6–20)
CALCIUM SERPL-MCNC: 8.8 MG/DL (ref 8.7–10.5)
CHLORIDE SERPL-SCNC: 106 MMOL/L (ref 95–110)
CHOLEST SERPL-MCNC: 122 MG/DL (ref 120–199)
CHOLEST/HDLC SERPL: 3.9 {RATIO} (ref 2–5)
CO2 SERPL-SCNC: 28 MMOL/L (ref 23–29)
CREAT SERPL-MCNC: 1.3 MG/DL (ref 0.5–1.4)
DIFFERENTIAL METHOD: NORMAL
EOSINOPHIL # BLD AUTO: 0.1 K/UL (ref 0–0.5)
EOSINOPHIL NFR BLD: 1.8 % (ref 0–8)
ERYTHROCYTE [DISTWIDTH] IN BLOOD BY AUTOMATED COUNT: 13.1 % (ref 11.5–14.5)
EST. GFR  (AFRICAN AMERICAN): >60 ML/MIN/1.73 M^2
EST. GFR  (NON AFRICAN AMERICAN): >60 ML/MIN/1.73 M^2
ESTIMATED AVG GLUCOSE: 103 MG/DL (ref 68–131)
GLUCOSE SERPL-MCNC: 127 MG/DL (ref 70–110)
HBA1C MFR BLD: 5.2 % (ref 4–5.6)
HCT VFR BLD AUTO: 46 % (ref 40–54)
HDLC SERPL-MCNC: 31 MG/DL (ref 40–75)
HDLC SERPL: 25.4 % (ref 20–50)
HGB BLD-MCNC: 15.7 G/DL (ref 14–18)
IMM GRANULOCYTES # BLD AUTO: 0.01 K/UL (ref 0–0.04)
IMM GRANULOCYTES NFR BLD AUTO: 0.2 % (ref 0–0.5)
LDLC SERPL CALC-MCNC: 58.2 MG/DL (ref 63–159)
LYMPHOCYTES # BLD AUTO: 1.8 K/UL (ref 1–4.8)
LYMPHOCYTES NFR BLD: 29 % (ref 18–48)
MCH RBC QN AUTO: 29.7 PG (ref 27–31)
MCHC RBC AUTO-ENTMCNC: 34.1 G/DL (ref 32–36)
MCV RBC AUTO: 87 FL (ref 82–98)
MONOCYTES # BLD AUTO: 0.5 K/UL (ref 0.3–1)
MONOCYTES NFR BLD: 7.2 % (ref 4–15)
NEUTROPHILS # BLD AUTO: 3.9 K/UL (ref 1.8–7.7)
NEUTROPHILS NFR BLD: 61.5 % (ref 38–73)
NONHDLC SERPL-MCNC: 91 MG/DL
NRBC BLD-RTO: 0 /100 WBC
PLATELET # BLD AUTO: 219 K/UL (ref 150–450)
PMV BLD AUTO: 11.6 FL (ref 9.2–12.9)
POTASSIUM SERPL-SCNC: 4 MMOL/L (ref 3.5–5.1)
PROT SERPL-MCNC: 6.8 G/DL (ref 6–8.4)
RBC # BLD AUTO: 5.29 M/UL (ref 4.6–6.2)
SODIUM SERPL-SCNC: 143 MMOL/L (ref 136–145)
TRIGL SERPL-MCNC: 164 MG/DL (ref 30–150)
TSH SERPL DL<=0.005 MIU/L-ACNC: 1.28 UIU/ML (ref 0.4–4)
VIT B12 SERPL-MCNC: 400 PG/ML (ref 210–950)
WBC # BLD AUTO: 6.25 K/UL (ref 3.9–12.7)

## 2021-11-10 PROCEDURE — 1160F PR REVIEW ALL MEDS BY PRESCRIBER/CLIN PHARMACIST DOCUMENTED: ICD-10-PCS | Mod: CPTII,S$GLB,, | Performed by: INTERNAL MEDICINE

## 2021-11-10 PROCEDURE — 82306 VITAMIN D 25 HYDROXY: CPT | Performed by: INTERNAL MEDICINE

## 2021-11-10 PROCEDURE — 3008F PR BODY MASS INDEX (BMI) DOCUMENTED: ICD-10-PCS | Mod: CPTII,S$GLB,, | Performed by: INTERNAL MEDICINE

## 2021-11-10 PROCEDURE — 83036 HEMOGLOBIN GLYCOSYLATED A1C: CPT | Performed by: INTERNAL MEDICINE

## 2021-11-10 PROCEDURE — 3078F PR MOST RECENT DIASTOLIC BLOOD PRESSURE < 80 MM HG: ICD-10-PCS | Mod: CPTII,S$GLB,, | Performed by: INTERNAL MEDICINE

## 2021-11-10 PROCEDURE — 1159F MED LIST DOCD IN RCRD: CPT | Mod: CPTII,S$GLB,, | Performed by: INTERNAL MEDICINE

## 2021-11-10 PROCEDURE — 1160F RVW MEDS BY RX/DR IN RCRD: CPT | Mod: CPTII,S$GLB,, | Performed by: INTERNAL MEDICINE

## 2021-11-10 PROCEDURE — 1159F PR MEDICATION LIST DOCUMENTED IN MEDICAL RECORD: ICD-10-PCS | Mod: CPTII,S$GLB,, | Performed by: INTERNAL MEDICINE

## 2021-11-10 PROCEDURE — 84443 ASSAY THYROID STIM HORMONE: CPT | Performed by: INTERNAL MEDICINE

## 2021-11-10 PROCEDURE — 3074F PR MOST RECENT SYSTOLIC BLOOD PRESSURE < 130 MM HG: ICD-10-PCS | Mod: CPTII,S$GLB,, | Performed by: INTERNAL MEDICINE

## 2021-11-10 PROCEDURE — 3078F DIAST BP <80 MM HG: CPT | Mod: CPTII,S$GLB,, | Performed by: INTERNAL MEDICINE

## 2021-11-10 PROCEDURE — 85025 COMPLETE CBC W/AUTO DIFF WBC: CPT | Performed by: INTERNAL MEDICINE

## 2021-11-10 PROCEDURE — 82607 VITAMIN B-12: CPT | Performed by: INTERNAL MEDICINE

## 2021-11-10 PROCEDURE — 80061 LIPID PANEL: CPT | Performed by: INTERNAL MEDICINE

## 2021-11-10 PROCEDURE — 80053 COMPREHEN METABOLIC PANEL: CPT | Performed by: INTERNAL MEDICINE

## 2021-11-10 PROCEDURE — 36415 COLL VENOUS BLD VENIPUNCTURE: CPT | Performed by: INTERNAL MEDICINE

## 2021-11-10 PROCEDURE — 3008F BODY MASS INDEX DOCD: CPT | Mod: CPTII,S$GLB,, | Performed by: INTERNAL MEDICINE

## 2021-11-10 PROCEDURE — 99213 PR OFFICE/OUTPT VISIT, EST, LEVL III, 20-29 MIN: ICD-10-PCS | Mod: S$GLB,,, | Performed by: INTERNAL MEDICINE

## 2021-11-10 PROCEDURE — 3074F SYST BP LT 130 MM HG: CPT | Mod: CPTII,S$GLB,, | Performed by: INTERNAL MEDICINE

## 2021-11-10 PROCEDURE — 99213 OFFICE O/P EST LOW 20 MIN: CPT | Mod: S$GLB,,, | Performed by: INTERNAL MEDICINE

## 2021-11-11 ENCOUNTER — PATIENT MESSAGE (OUTPATIENT)
Dept: INTERNAL MEDICINE | Facility: CLINIC | Age: 31
End: 2021-11-11
Payer: COMMERCIAL

## 2021-12-11 RX ORDER — AZITHROMYCIN 250 MG/1
TABLET, FILM COATED ORAL
Qty: 6 TABLET | Refills: 0 | Status: SHIPPED | OUTPATIENT
Start: 2021-12-11 | End: 2021-12-16

## 2021-12-22 ENCOUNTER — IMMUNIZATION (OUTPATIENT)
Dept: INTERNAL MEDICINE | Facility: CLINIC | Age: 31
End: 2021-12-22
Payer: COMMERCIAL

## 2021-12-22 DIAGNOSIS — Z23 NEED FOR VACCINATION: Primary | ICD-10-CM

## 2021-12-22 PROCEDURE — 0004A COVID-19, MRNA, LNP-S, PF, 30 MCG/0.3 ML DOSE VACCINE: CPT | Mod: PBBFAC | Performed by: INTERNAL MEDICINE

## 2022-02-17 ENCOUNTER — PATIENT MESSAGE (OUTPATIENT)
Dept: INTERNAL MEDICINE | Facility: CLINIC | Age: 32
End: 2022-02-17
Payer: COMMERCIAL

## 2022-05-02 RX ORDER — CLOTRIMAZOLE AND BETAMETHASONE DIPROPIONATE 10; .64 MG/G; MG/G
CREAM TOPICAL 2 TIMES DAILY
Qty: 45 G | Refills: 1 | Status: SHIPPED | OUTPATIENT
Start: 2022-05-02 | End: 2022-10-26

## 2022-07-18 ENCOUNTER — PATIENT MESSAGE (OUTPATIENT)
Dept: INTERNAL MEDICINE | Facility: CLINIC | Age: 32
End: 2022-07-18
Payer: COMMERCIAL

## 2022-07-28 ENCOUNTER — OFFICE VISIT (OUTPATIENT)
Dept: INTERNAL MEDICINE | Facility: CLINIC | Age: 32
End: 2022-07-28
Attending: INTERNAL MEDICINE
Payer: COMMERCIAL

## 2022-07-28 VITALS
HEART RATE: 63 BPM | BODY MASS INDEX: 41.75 KG/M2 | OXYGEN SATURATION: 98 % | WEIGHT: 315 LBS | HEIGHT: 73 IN | SYSTOLIC BLOOD PRESSURE: 135 MMHG | DIASTOLIC BLOOD PRESSURE: 75 MMHG

## 2022-07-28 DIAGNOSIS — E66.01 CLASS 3 SEVERE OBESITY DUE TO EXCESS CALORIES WITHOUT SERIOUS COMORBIDITY WITH BODY MASS INDEX (BMI) OF 45.0 TO 49.9 IN ADULT: Primary | ICD-10-CM

## 2022-07-28 DIAGNOSIS — Z13.89 SCREENING FOR OBESITY: ICD-10-CM

## 2022-07-28 DIAGNOSIS — R03.0 BLOOD PRESSURE ELEVATED WITHOUT HISTORY OF HTN: ICD-10-CM

## 2022-07-28 PROCEDURE — 3078F DIAST BP <80 MM HG: CPT | Mod: CPTII,S$GLB,, | Performed by: INTERNAL MEDICINE

## 2022-07-28 PROCEDURE — 3078F PR MOST RECENT DIASTOLIC BLOOD PRESSURE < 80 MM HG: ICD-10-PCS | Mod: CPTII,S$GLB,, | Performed by: INTERNAL MEDICINE

## 2022-07-28 PROCEDURE — 3008F BODY MASS INDEX DOCD: CPT | Mod: CPTII,S$GLB,, | Performed by: INTERNAL MEDICINE

## 2022-07-28 PROCEDURE — 1159F PR MEDICATION LIST DOCUMENTED IN MEDICAL RECORD: ICD-10-PCS | Mod: CPTII,S$GLB,, | Performed by: INTERNAL MEDICINE

## 2022-07-28 PROCEDURE — 99214 OFFICE O/P EST MOD 30 MIN: CPT | Mod: 25,S$GLB,, | Performed by: INTERNAL MEDICINE

## 2022-07-28 PROCEDURE — 1159F MED LIST DOCD IN RCRD: CPT | Mod: CPTII,S$GLB,, | Performed by: INTERNAL MEDICINE

## 2022-07-28 PROCEDURE — 1160F PR REVIEW ALL MEDS BY PRESCRIBER/CLIN PHARMACIST DOCUMENTED: ICD-10-PCS | Mod: CPTII,S$GLB,, | Performed by: INTERNAL MEDICINE

## 2022-07-28 PROCEDURE — 99214 PR OFFICE/OUTPT VISIT, EST, LEVL IV, 30-39 MIN: ICD-10-PCS | Mod: 25,S$GLB,, | Performed by: INTERNAL MEDICINE

## 2022-07-28 PROCEDURE — G0447 BEHAVIOR COUNSEL OBESITY 15M: HCPCS | Mod: 59,S$GLB,, | Performed by: INTERNAL MEDICINE

## 2022-07-28 PROCEDURE — 3008F PR BODY MASS INDEX (BMI) DOCUMENTED: ICD-10-PCS | Mod: CPTII,S$GLB,, | Performed by: INTERNAL MEDICINE

## 2022-07-28 PROCEDURE — 1160F RVW MEDS BY RX/DR IN RCRD: CPT | Mod: CPTII,S$GLB,, | Performed by: INTERNAL MEDICINE

## 2022-07-28 PROCEDURE — G0447 PR OBESITY COUNSELING: ICD-10-PCS | Mod: 59,S$GLB,, | Performed by: INTERNAL MEDICINE

## 2022-07-28 PROCEDURE — 3075F SYST BP GE 130 - 139MM HG: CPT | Mod: CPTII,S$GLB,, | Performed by: INTERNAL MEDICINE

## 2022-07-28 PROCEDURE — 3075F PR MOST RECENT SYSTOLIC BLOOD PRESS GE 130-139MM HG: ICD-10-PCS | Mod: CPTII,S$GLB,, | Performed by: INTERNAL MEDICINE

## 2022-07-28 RX ORDER — PHENTERMINE HYDROCHLORIDE 37.5 MG/1
TABLET ORAL
Qty: 30 TABLET | Refills: 2 | Status: SHIPPED | OUTPATIENT
Start: 2022-07-28 | End: 2022-10-26 | Stop reason: SDUPTHER

## 2022-07-28 NOTE — PROGRESS NOTES
Subjective:       Patient ID: Curtis Moseley is a 31 y.o. male.    Chief Complaint: Weight Loss    He joined weight [employer] in November of 2021 but has been strict about following the diet for the past 6-8 weeks.  He wants to take a weight loss medication to help him lose weight.    Review of Systems   Constitutional: Negative.    Respiratory: Negative.    Cardiovascular: Negative.          Objective:      Physical Exam  Vitals and nursing note reviewed.   Constitutional:       General: He is not in acute distress.     Appearance: He is well-developed. He is not diaphoretic.   HENT:      Head: Normocephalic.      Right Ear: Ear canal and external ear normal.      Left Ear: Ear canal and external ear normal.      Nose: Nose normal.      Mouth/Throat:      Pharynx: No oropharyngeal exudate.   Eyes:      General: No scleral icterus.        Right eye: No discharge.         Left eye: No discharge.      Conjunctiva/sclera: Conjunctivae normal.      Pupils: Pupils are equal, round, and reactive to light.   Neck:      Thyroid: No thyromegaly.      Vascular: No JVD.   Cardiovascular:      Rate and Rhythm: Normal rate and regular rhythm.      Heart sounds: Normal heart sounds. No murmur heard.    No friction rub. No gallop.   Pulmonary:      Effort: Pulmonary effort is normal. No respiratory distress.      Breath sounds: Normal breath sounds. No stridor. No wheezing or rales.   Chest:      Chest wall: No tenderness.   Abdominal:      General: Bowel sounds are normal. There is no distension.      Palpations: Abdomen is soft. There is no mass.      Tenderness: There is no abdominal tenderness. There is no guarding or rebound.   Musculoskeletal:         General: No tenderness. Normal range of motion.      Cervical back: Normal range of motion and neck supple.   Lymphadenopathy:      Cervical: No cervical adenopathy.   Skin:     General: Skin is warm and dry.      Findings: No rash.   Neurological:      Mental Status: He is  alert and oriented to person, place, and time.      Cranial Nerves: No cranial nerve deficit.      Coordination: Coordination normal.      Deep Tendon Reflexes: Reflexes are normal and symmetric. Reflexes normal.   Psychiatric:         Behavior: Behavior normal.         Assessment:       Problem List Items Addressed This Visit        Unprioritized    Class 3 severe obesity due to excess calories without serious comorbidity with body mass index (BMI) of 45.0 to 49.9 in adult - Primary      Other Visit Diagnoses     Blood pressure elevated without history of HTN              Plan:       Per orders and D/C instructions.     we discussed the benefits and risks of multiple weight loss medications including Adipex, Wellbutrin, metformin, and Ozempic.  He would like to start taking Adipex in conjunction with his weight watchers diet.  We did discuss that it will likely slightly increase his heart rate and blood pressure and that needs to be monitored.  Also nervousness and insomnia are potential side effects.  Follow-up in 3 months.    Between 30 and 39 min of total time for evaluation and management services were spent on the patient today.  The medical problems and treatment options were discussed, and all questions were answered.        The patient was screened for obesity (BMI>30), Since the current BMI is > 30, the possible consequences of obesity, as well as the benefits of diet, exercise, and weight loss were discussed. Any behavioral risks were identified, and methods to achieve appropriate treatment goals were discussed (15 minutes spent).

## 2022-10-26 ENCOUNTER — OFFICE VISIT (OUTPATIENT)
Dept: INTERNAL MEDICINE | Facility: CLINIC | Age: 32
End: 2022-10-26
Attending: INTERNAL MEDICINE
Payer: COMMERCIAL

## 2022-10-26 VITALS
WEIGHT: 308 LBS | HEIGHT: 73 IN | OXYGEN SATURATION: 98 % | SYSTOLIC BLOOD PRESSURE: 112 MMHG | HEART RATE: 92 BPM | DIASTOLIC BLOOD PRESSURE: 78 MMHG | BODY MASS INDEX: 40.82 KG/M2

## 2022-10-26 DIAGNOSIS — E66.01 CLASS 3 SEVERE OBESITY DUE TO EXCESS CALORIES WITHOUT SERIOUS COMORBIDITY WITH BODY MASS INDEX (BMI) OF 45.0 TO 49.9 IN ADULT: ICD-10-CM

## 2022-10-26 DIAGNOSIS — Z00.00 ROUTINE ADULT HEALTH MAINTENANCE: Primary | ICD-10-CM

## 2022-10-26 PROCEDURE — 90471 FLU VACCINE (QUAD) GREATER THAN OR EQUAL TO 3YO PRESERVATIVE FREE IM: ICD-10-PCS | Mod: S$GLB,,, | Performed by: INTERNAL MEDICINE

## 2022-10-26 PROCEDURE — 3074F PR MOST RECENT SYSTOLIC BLOOD PRESSURE < 130 MM HG: ICD-10-PCS | Mod: CPTII,S$GLB,, | Performed by: INTERNAL MEDICINE

## 2022-10-26 PROCEDURE — 1160F RVW MEDS BY RX/DR IN RCRD: CPT | Mod: CPTII,S$GLB,, | Performed by: INTERNAL MEDICINE

## 2022-10-26 PROCEDURE — 1160F PR REVIEW ALL MEDS BY PRESCRIBER/CLIN PHARMACIST DOCUMENTED: ICD-10-PCS | Mod: CPTII,S$GLB,, | Performed by: INTERNAL MEDICINE

## 2022-10-26 PROCEDURE — 3074F SYST BP LT 130 MM HG: CPT | Mod: CPTII,S$GLB,, | Performed by: INTERNAL MEDICINE

## 2022-10-26 PROCEDURE — 90686 IIV4 VACC NO PRSV 0.5 ML IM: CPT | Mod: S$GLB,,, | Performed by: INTERNAL MEDICINE

## 2022-10-26 PROCEDURE — 99395 PREV VISIT EST AGE 18-39: CPT | Mod: 25,S$GLB,, | Performed by: INTERNAL MEDICINE

## 2022-10-26 PROCEDURE — 3078F DIAST BP <80 MM HG: CPT | Mod: CPTII,S$GLB,, | Performed by: INTERNAL MEDICINE

## 2022-10-26 PROCEDURE — 1159F MED LIST DOCD IN RCRD: CPT | Mod: CPTII,S$GLB,, | Performed by: INTERNAL MEDICINE

## 2022-10-26 PROCEDURE — 1159F PR MEDICATION LIST DOCUMENTED IN MEDICAL RECORD: ICD-10-PCS | Mod: CPTII,S$GLB,, | Performed by: INTERNAL MEDICINE

## 2022-10-26 PROCEDURE — 90471 IMMUNIZATION ADMIN: CPT | Mod: S$GLB,,, | Performed by: INTERNAL MEDICINE

## 2022-10-26 PROCEDURE — 99395 PR PREVENTIVE VISIT,EST,18-39: ICD-10-PCS | Mod: 25,S$GLB,, | Performed by: INTERNAL MEDICINE

## 2022-10-26 PROCEDURE — 90686 FLU VACCINE (QUAD) GREATER THAN OR EQUAL TO 3YO PRESERVATIVE FREE IM: ICD-10-PCS | Mod: S$GLB,,, | Performed by: INTERNAL MEDICINE

## 2022-10-26 PROCEDURE — 3078F PR MOST RECENT DIASTOLIC BLOOD PRESSURE < 80 MM HG: ICD-10-PCS | Mod: CPTII,S$GLB,, | Performed by: INTERNAL MEDICINE

## 2022-10-26 RX ORDER — PHENTERMINE HYDROCHLORIDE 37.5 MG/1
TABLET ORAL
Qty: 30 TABLET | Refills: 2 | Status: SHIPPED | OUTPATIENT
Start: 2022-10-26 | End: 2023-01-25 | Stop reason: SDUPTHER

## 2022-10-26 NOTE — PROGRESS NOTES
Subjective:       Patient ID: Curtis Moseley is a 31 y.o. male.    Chief Complaint: Annual Exam (Has been lightheaded over the last couple weeks when standing up from sitting position )    He started Adipex 3 months ago along with weight watchers any he has lost 47 pounds.  He still gets hungry, but resists the urge to eat as much.      Adult Wellness Exam:    Mental Conditions: None  Depression Risk Factors: None  BMI: See Vital signs   Colon screen:    See Health Maintenance Report                                      Vaccines (Flu, Adacel, Shingrix): See Health Maintenance Report  Routine labs (Cholesterol, Glucose/Hgb A1C, and TSH): Done    The patient's current health status is: Good   Patient was educated on all medical problems and routine health maintenance. See Patient Instructions.                                 Review of Systems   Constitutional: Negative.    Respiratory: Negative.     Cardiovascular: Negative.        Objective:      Physical Exam    Assessment:       Problem List Items Addressed This Visit          Unprioritized    Class 3 severe obesity due to excess calories without serious comorbidity with body mass index (BMI) of 45.0 to 49.9 in adult     Other Visit Diagnoses       Routine adult health maintenance    -  Primary              Plan:       Per orders and D/C instructions.    Continue Adipex for obesity for another 3-6 months.  He would like to continue it until after he goes to [place] in January and is in a wedding in February, which seems reasonable.

## 2022-10-26 NOTE — PATIENT INSTRUCTIONS
Continue your current diet, exercise, and weight loss.  Try to drink 1 gallon of water daily while you are losing weight.

## 2022-11-04 ENCOUNTER — OFFICE VISIT (OUTPATIENT)
Dept: OPTOMETRY | Facility: CLINIC | Age: 32
End: 2022-11-04
Payer: COMMERCIAL

## 2022-11-04 DIAGNOSIS — Z46.0 FITTING AND ADJUSTMENT OF SPECTACLES AND CONTACT LENSES: Primary | ICD-10-CM

## 2022-11-04 DIAGNOSIS — H52.13 MYOPIA OF BOTH EYES: Primary | ICD-10-CM

## 2022-11-04 DIAGNOSIS — Z97.3 WEARS CONTACT LENSES: ICD-10-CM

## 2022-11-04 PROCEDURE — 92310 CONTACT LENS FITTING OU: CPT | Mod: S$GLB,,, | Performed by: OPTOMETRIST

## 2022-11-04 PROCEDURE — 92014 PR EYE EXAM, EST PATIENT,COMPREHESV: ICD-10-PCS | Mod: S$GLB,,, | Performed by: OPTOMETRIST

## 2022-11-04 PROCEDURE — 92014 COMPRE OPH EXAM EST PT 1/>: CPT | Mod: S$GLB,,, | Performed by: OPTOMETRIST

## 2022-11-04 PROCEDURE — 92015 PR REFRACTION: ICD-10-PCS | Mod: S$GLB,,, | Performed by: OPTOMETRIST

## 2022-11-04 PROCEDURE — 99999 PR PBB SHADOW E&M-EST. PATIENT-LVL II: CPT | Mod: PBBFAC,,, | Performed by: OPTOMETRIST

## 2022-11-04 PROCEDURE — 99999 PR PBB SHADOW E&M-EST. PATIENT-LVL II: ICD-10-PCS | Mod: PBBFAC,,, | Performed by: OPTOMETRIST

## 2022-11-04 PROCEDURE — 92310 PR CONTACT LENS FITTING (NO CHANGE): ICD-10-PCS | Mod: S$GLB,,, | Performed by: OPTOMETRIST

## 2022-11-04 PROCEDURE — 92015 DETERMINE REFRACTIVE STATE: CPT | Mod: S$GLB,,, | Performed by: OPTOMETRIST

## 2022-11-04 NOTE — PROGRESS NOTES
HPI    DLS: 6/12/2019 Dr. Bolton     31 y.o. male is here for Ocular Health Check and Contact Lens Follow Up.   H/o Myopia of both eyes with astigmatism. Denies eye pain and   flashes/floaters. No noticeable VA changes since last visit. No diplopia   or headaches. Around bedtime eyes become dry.     Eye Med's: Rewetting prn OU   Last edited by Crhis Posey, OA on 11/4/2022  3:24 PM.        ROS    Negative for: Constitutional, Gastrointestinal, Neurological, Skin,   Genitourinary, Musculoskeletal, HENT, Endocrine, Cardiovascular, Eyes,   Respiratory, Psychiatric, Allergic/Imm, Heme/Lymph  Last edited by Quentin Mancilla, OD on 11/4/2022  3:45 PM.        Assessment /Plan     For exam results, see Encounter Report.    Myopia of both eyes    Wears contact lenses      Good fit/VA w [facility] CLs--pt happy    PLAN:    Wrote spex/CLRx  Continue Daily Wear schedule.  NO SLEEPING IN CONTACT LENSES. Clean and disinfect nightly.  exchange monthly.     Rtc 1 yr

## 2022-11-08 ENCOUNTER — PATIENT MESSAGE (OUTPATIENT)
Dept: OPTOMETRY | Facility: CLINIC | Age: 32
End: 2022-11-08
Payer: COMMERCIAL

## 2023-01-11 ENCOUNTER — IMMUNIZATION (OUTPATIENT)
Dept: INTERNAL MEDICINE | Facility: CLINIC | Age: 33
End: 2023-01-11
Payer: COMMERCIAL

## 2023-01-11 DIAGNOSIS — Z23 NEED FOR VACCINATION: Primary | ICD-10-CM

## 2023-01-11 PROCEDURE — 91312 COVID-19, MRNA, LNP-S, BIVALENT BOOSTER, PF, 30 MCG/0.3 ML DOSE: ICD-10-PCS | Mod: S$GLB,,, | Performed by: INTERNAL MEDICINE

## 2023-01-11 PROCEDURE — 91312 COVID-19, MRNA, LNP-S, BIVALENT BOOSTER, PF, 30 MCG/0.3 ML DOSE: CPT | Mod: S$GLB,,, | Performed by: INTERNAL MEDICINE

## 2023-01-11 PROCEDURE — 0124A COVID-19, MRNA, LNP-S, BIVALENT BOOSTER, PF, 30 MCG/0.3 ML DOSE: CPT | Mod: PBBFAC | Performed by: INTERNAL MEDICINE

## 2023-01-12 ENCOUNTER — PATIENT MESSAGE (OUTPATIENT)
Dept: INTERNAL MEDICINE | Facility: CLINIC | Age: 33
End: 2023-01-12
Payer: COMMERCIAL

## 2023-01-12 RX ORDER — PHENTERMINE HYDROCHLORIDE 37.5 MG/1
TABLET ORAL
Qty: 30 TABLET | Refills: 2 | Status: CANCELLED | OUTPATIENT
Start: 2023-01-12

## 2023-01-25 ENCOUNTER — LAB VISIT (OUTPATIENT)
Dept: LAB | Facility: OTHER | Age: 33
End: 2023-01-25
Attending: INTERNAL MEDICINE
Payer: COMMERCIAL

## 2023-01-25 ENCOUNTER — OFFICE VISIT (OUTPATIENT)
Dept: INTERNAL MEDICINE | Facility: CLINIC | Age: 33
End: 2023-01-25
Attending: INTERNAL MEDICINE
Payer: COMMERCIAL

## 2023-01-25 VITALS
WEIGHT: 278 LBS | HEART RATE: 107 BPM | HEIGHT: 73 IN | DIASTOLIC BLOOD PRESSURE: 70 MMHG | BODY MASS INDEX: 36.84 KG/M2 | SYSTOLIC BLOOD PRESSURE: 118 MMHG | OXYGEN SATURATION: 99 %

## 2023-01-25 DIAGNOSIS — E78.9 DISORDER OF LIPID METABOLISM: ICD-10-CM

## 2023-01-25 DIAGNOSIS — E03.9 HYPOTHYROIDISM, UNSPECIFIED TYPE: ICD-10-CM

## 2023-01-25 DIAGNOSIS — E55.9 VITAMIN D DEFICIENCY: ICD-10-CM

## 2023-01-25 DIAGNOSIS — D51.0 PERNICIOUS ANEMIA: ICD-10-CM

## 2023-01-25 DIAGNOSIS — D50.8 OTHER IRON DEFICIENCY ANEMIA: ICD-10-CM

## 2023-01-25 DIAGNOSIS — Z00.00 ROUTINE ADULT HEALTH MAINTENANCE: Primary | ICD-10-CM

## 2023-01-25 DIAGNOSIS — E66.09 CLASS 2 OBESITY DUE TO EXCESS CALORIES WITHOUT SERIOUS COMORBIDITY WITH BODY MASS INDEX (BMI) OF 36.0 TO 36.9 IN ADULT: Primary | ICD-10-CM

## 2023-01-25 DIAGNOSIS — R79.89 OTHER SPECIFIED ABNORMAL FINDINGS OF BLOOD CHEMISTRY: ICD-10-CM

## 2023-01-25 DIAGNOSIS — R74.01 ELEVATED TRANSAMINASE LEVEL: ICD-10-CM

## 2023-01-25 DIAGNOSIS — Z00.00 ROUTINE ADULT HEALTH MAINTENANCE: ICD-10-CM

## 2023-01-25 DIAGNOSIS — Z12.5 SCREENING FOR PROSTATE CANCER: ICD-10-CM

## 2023-01-25 PROBLEM — E66.812 CLASS 2 OBESITY DUE TO EXCESS CALORIES WITHOUT SERIOUS COMORBIDITY WITH BODY MASS INDEX (BMI) OF 36.0 TO 36.9 IN ADULT: Status: ACTIVE | Noted: 2021-05-05

## 2023-01-25 LAB
25(OH)D3+25(OH)D2 SERPL-MCNC: 27 NG/ML (ref 30–96)
ALBUMIN SERPL BCP-MCNC: 4.3 G/DL (ref 3.5–5.2)
ALP SERPL-CCNC: 72 U/L (ref 55–135)
ALT SERPL W/O P-5'-P-CCNC: 40 U/L (ref 10–44)
ANION GAP SERPL CALC-SCNC: 6 MMOL/L (ref 8–16)
AST SERPL-CCNC: 26 U/L (ref 10–40)
BASOPHILS # BLD AUTO: 0.03 K/UL (ref 0–0.2)
BASOPHILS NFR BLD: 0.5 % (ref 0–1.9)
BILIRUB SERPL-MCNC: 0.8 MG/DL (ref 0.1–1)
BUN SERPL-MCNC: 16 MG/DL (ref 6–20)
CALCIUM SERPL-MCNC: 9.8 MG/DL (ref 8.7–10.5)
CHLORIDE SERPL-SCNC: 105 MMOL/L (ref 95–110)
CHOLEST SERPL-MCNC: 156 MG/DL (ref 120–199)
CHOLEST/HDLC SERPL: 4.5 {RATIO} (ref 2–5)
CO2 SERPL-SCNC: 29 MMOL/L (ref 23–29)
CREAT SERPL-MCNC: 1.3 MG/DL (ref 0.5–1.4)
DIFFERENTIAL METHOD: ABNORMAL
EOSINOPHIL # BLD AUTO: 0.1 K/UL (ref 0–0.5)
EOSINOPHIL NFR BLD: 1.7 % (ref 0–8)
ERYTHROCYTE [DISTWIDTH] IN BLOOD BY AUTOMATED COUNT: 12.8 % (ref 11.5–14.5)
EST. GFR  (NO RACE VARIABLE): >60 ML/MIN/1.73 M^2
ESTIMATED AVG GLUCOSE: 97 MG/DL (ref 68–131)
GLUCOSE SERPL-MCNC: 94 MG/DL (ref 70–110)
HBA1C MFR BLD: 5 % (ref 4–5.6)
HCT VFR BLD AUTO: 47.7 % (ref 40–54)
HDLC SERPL-MCNC: 35 MG/DL (ref 40–75)
HDLC SERPL: 22.4 % (ref 20–50)
HGB BLD-MCNC: 17 G/DL (ref 14–18)
IMM GRANULOCYTES # BLD AUTO: 0.01 K/UL (ref 0–0.04)
IMM GRANULOCYTES NFR BLD AUTO: 0.2 % (ref 0–0.5)
LDLC SERPL CALC-MCNC: 102.2 MG/DL (ref 63–159)
LYMPHOCYTES # BLD AUTO: 2 K/UL (ref 1–4.8)
LYMPHOCYTES NFR BLD: 30.1 % (ref 18–48)
MCH RBC QN AUTO: 31.3 PG (ref 27–31)
MCHC RBC AUTO-ENTMCNC: 35.6 G/DL (ref 32–36)
MCV RBC AUTO: 88 FL (ref 82–98)
MONOCYTES # BLD AUTO: 0.6 K/UL (ref 0.3–1)
MONOCYTES NFR BLD: 8.5 % (ref 4–15)
NEUTROPHILS # BLD AUTO: 3.9 K/UL (ref 1.8–7.7)
NEUTROPHILS NFR BLD: 59 % (ref 38–73)
NONHDLC SERPL-MCNC: 121 MG/DL
NRBC BLD-RTO: 0 /100 WBC
PLATELET # BLD AUTO: 233 K/UL (ref 150–450)
PMV BLD AUTO: 10.4 FL (ref 9.2–12.9)
POTASSIUM SERPL-SCNC: 4.7 MMOL/L (ref 3.5–5.1)
PROT SERPL-MCNC: 7.4 G/DL (ref 6–8.4)
RBC # BLD AUTO: 5.44 M/UL (ref 4.6–6.2)
SODIUM SERPL-SCNC: 140 MMOL/L (ref 136–145)
TRIGL SERPL-MCNC: 94 MG/DL (ref 30–150)
TSH SERPL DL<=0.005 MIU/L-ACNC: 1.76 UIU/ML (ref 0.4–4)
VIT B12 SERPL-MCNC: 376 PG/ML (ref 210–950)
WBC # BLD AUTO: 6.58 K/UL (ref 3.9–12.7)

## 2023-01-25 PROCEDURE — 82306 VITAMIN D 25 HYDROXY: CPT | Performed by: INTERNAL MEDICINE

## 2023-01-25 PROCEDURE — 80053 COMPREHEN METABOLIC PANEL: CPT | Performed by: INTERNAL MEDICINE

## 2023-01-25 PROCEDURE — 1160F PR REVIEW ALL MEDS BY PRESCRIBER/CLIN PHARMACIST DOCUMENTED: ICD-10-PCS | Mod: CPTII,S$GLB,, | Performed by: INTERNAL MEDICINE

## 2023-01-25 PROCEDURE — 3074F SYST BP LT 130 MM HG: CPT | Mod: CPTII,S$GLB,, | Performed by: INTERNAL MEDICINE

## 2023-01-25 PROCEDURE — 3078F DIAST BP <80 MM HG: CPT | Mod: CPTII,S$GLB,, | Performed by: INTERNAL MEDICINE

## 2023-01-25 PROCEDURE — 83036 HEMOGLOBIN GLYCOSYLATED A1C: CPT | Performed by: INTERNAL MEDICINE

## 2023-01-25 PROCEDURE — 1159F PR MEDICATION LIST DOCUMENTED IN MEDICAL RECORD: ICD-10-PCS | Mod: CPTII,S$GLB,, | Performed by: INTERNAL MEDICINE

## 2023-01-25 PROCEDURE — 36415 COLL VENOUS BLD VENIPUNCTURE: CPT | Performed by: INTERNAL MEDICINE

## 2023-01-25 PROCEDURE — 1160F RVW MEDS BY RX/DR IN RCRD: CPT | Mod: CPTII,S$GLB,, | Performed by: INTERNAL MEDICINE

## 2023-01-25 PROCEDURE — 84443 ASSAY THYROID STIM HORMONE: CPT | Performed by: INTERNAL MEDICINE

## 2023-01-25 PROCEDURE — 99214 PR OFFICE/OUTPT VISIT, EST, LEVL IV, 30-39 MIN: ICD-10-PCS | Mod: S$GLB,,, | Performed by: INTERNAL MEDICINE

## 2023-01-25 PROCEDURE — 80061 LIPID PANEL: CPT | Performed by: INTERNAL MEDICINE

## 2023-01-25 PROCEDURE — 99214 OFFICE O/P EST MOD 30 MIN: CPT | Mod: S$GLB,,, | Performed by: INTERNAL MEDICINE

## 2023-01-25 PROCEDURE — 85025 COMPLETE CBC W/AUTO DIFF WBC: CPT | Performed by: INTERNAL MEDICINE

## 2023-01-25 PROCEDURE — 1159F MED LIST DOCD IN RCRD: CPT | Mod: CPTII,S$GLB,, | Performed by: INTERNAL MEDICINE

## 2023-01-25 PROCEDURE — 3078F PR MOST RECENT DIASTOLIC BLOOD PRESSURE < 80 MM HG: ICD-10-PCS | Mod: CPTII,S$GLB,, | Performed by: INTERNAL MEDICINE

## 2023-01-25 PROCEDURE — 3008F BODY MASS INDEX DOCD: CPT | Mod: CPTII,S$GLB,, | Performed by: INTERNAL MEDICINE

## 2023-01-25 PROCEDURE — 82607 VITAMIN B-12: CPT | Performed by: INTERNAL MEDICINE

## 2023-01-25 PROCEDURE — 3074F PR MOST RECENT SYSTOLIC BLOOD PRESSURE < 130 MM HG: ICD-10-PCS | Mod: CPTII,S$GLB,, | Performed by: INTERNAL MEDICINE

## 2023-01-25 PROCEDURE — 3008F PR BODY MASS INDEX (BMI) DOCUMENTED: ICD-10-PCS | Mod: CPTII,S$GLB,, | Performed by: INTERNAL MEDICINE

## 2023-01-25 RX ORDER — PHENTERMINE HYDROCHLORIDE 37.5 MG/1
TABLET ORAL
Qty: 30 TABLET | Refills: 2 | Status: SHIPPED | OUTPATIENT
Start: 2023-01-25 | End: 2023-04-28 | Stop reason: SDUPTHER

## 2023-01-25 NOTE — PROGRESS NOTES
Subjective:       Patient ID: Curtis Moseley is a 32 y.o. male.    Chief Complaint: Annual Exam (Pain in L side under ribs for about 1 week which seems to be getting better )    He is following weight watchers, exercising, and taking Adipex.  He has lost an additional 30 lb over the past 3 months.  He is lost approximately 80 lb over the past 6 months.    Review of Systems   Constitutional: Negative.    Respiratory: Negative.     Cardiovascular: Negative.        Objective:      Physical Exam  Vitals and nursing note reviewed.   Constitutional:       Appearance: He is well-developed.   HENT:      Head: Normocephalic and atraumatic.   Eyes:      Pupils: Pupils are equal, round, and reactive to light.   Cardiovascular:      Rate and Rhythm: Normal rate and regular rhythm.      Heart sounds: Normal heart sounds.   Pulmonary:      Effort: Pulmonary effort is normal.   Neurological:      Mental Status: He is alert.       Assessment:       Problem List Items Addressed This Visit          Unprioritized    Class 2 obesity due to excess calories without serious comorbidity with body mass index (BMI) of 36.0 to 36.9 in adult - Primary    Elevated transaminase level     Other Visit Diagnoses       Routine adult health maintenance                  Plan:       Per orders and D/C instructions.    Continue weight watchers diet and slowly increase aerobic exercise.  Continue Adipex for 3 more months for obesity.  In 3 months we will consider weaning off of Adipex.  Also consider adding Mounjaro or Ozempic.  We briefly discussed the risks and benefits of Mounjaro.  Check routine labs.    Between 30 and 39 min of total time for evaluation and management services were spent on the patient today.  The medical problems and treatment options were discussed, and all questions were answered.

## 2023-03-16 ENCOUNTER — PATIENT MESSAGE (OUTPATIENT)
Dept: INTERNAL MEDICINE | Facility: CLINIC | Age: 33
End: 2023-03-16
Payer: COMMERCIAL

## 2023-03-16 DIAGNOSIS — M50.321 OTHER CERVICAL DISC DEGENERATION AT C4-C5 LEVEL: Primary | ICD-10-CM

## 2023-04-03 ENCOUNTER — CLINICAL SUPPORT (OUTPATIENT)
Dept: REHABILITATION | Facility: OTHER | Age: 33
End: 2023-04-03
Payer: COMMERCIAL

## 2023-04-03 ENCOUNTER — CLINICAL SUPPORT (OUTPATIENT)
Dept: REHABILITATION | Facility: OTHER | Age: 33
End: 2023-04-03
Attending: INTERNAL MEDICINE
Payer: COMMERCIAL

## 2023-04-03 VITALS
WEIGHT: 261.94 LBS | SYSTOLIC BLOOD PRESSURE: 108 MMHG | DIASTOLIC BLOOD PRESSURE: 70 MMHG | HEART RATE: 80 BPM | BODY MASS INDEX: 34.71 KG/M2 | HEIGHT: 73 IN

## 2023-04-03 DIAGNOSIS — M50.321 OTHER CERVICAL DISC DEGENERATION AT C4-C5 LEVEL: ICD-10-CM

## 2023-04-03 DIAGNOSIS — M54.2 NECK PAIN: Primary | ICD-10-CM

## 2023-04-03 PROCEDURE — 99204 OFFICE O/P NEW MOD 45 MIN: CPT | Mod: ,,, | Performed by: PHYSICAL MEDICINE & REHABILITATION

## 2023-04-03 PROCEDURE — 99204 PR OFFICE/OUTPT VISIT, NEW, LEVL IV, 45-59 MIN: ICD-10-PCS | Mod: ,,, | Performed by: PHYSICAL MEDICINE & REHABILITATION

## 2023-04-03 NOTE — PROGRESS NOTES
Health  met with patient to complete initial outcomes for the Healthy Back cervical program.  Questions were reviewed with patient and discussed with Dr. Paredes. The patient will meet with Dr. Paredes to determine program enrollment.     Any changes to patient answers are below in red font.    HealthyBack Questionnaire  3/31/2023   Please select the location of your worst pain:  Upper Back   Please select the location of any additional pain: (hold down the control key, and click to select multiple responses) Neck, Upper back, Mid back - Above shoulder blades   Did the pain begin after an event, injury, or accident? No   How long has this pain been going on?  Uncertain   Please indicate how the pain is changing.  No Change   What is the WORST level of pain that you have experienced in the last week?  8   What is the LEAST level of pain that you have experienced in the past week?  0   What can you NOT do not that you used to be able to do?  Nothing   Are your limitations mainly due to your pain?  No   What are your additional complaints, if any? (hold down the control key, and click to select multiple responses) None   Is there ever a time during the day when your pain stops, even for a brief moment, before returning? Yes   If yes, when your pain stops, does it disappear completely? Is it totally gone? No   Does looking down make your typical pain worse? yes   Morning: Better during   Afternoon: worse   Evening:  worse   Nighttime: better during   Standing:  worse   Lying on stomach: Better   Lying on back: Better   Sitting:  Worse   Walking: Worse   Using a pillow: Better   Emergency department  No   Health care providers (hold down the control key, and click to select multiple responses) Family doctor   Investigations done (hold down the control key, and click to select multiple responses) None   Procedures (hold down the control key, and click to select multiple responses) None   Have you had any surgery on  your neck? No   Please anastasiya what you are experiencing. 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  Employer referral, Employee newsletter

## 2023-04-03 NOTE — PROGRESS NOTES
Subjective:     Patient ID: Curtis Moseley is a 32 y.o. male.    Chief Complaint: No chief complaint on file.    Mr Moseley is a 31 yo male sent in consultation by Dr. Baeza  for evaluation for the healthy back cervical program.  he has had neck pain for 5 years on and off.  He contributes it to weight and poor posture. He is loosing weight  He feels like pain is worsening the past 3 years.  The pain is midline upper back dominant and goes to the left neck and shoulder blade.  The pain is dull and achy with occasional shooting pains.  There is no tingling, numbness, burning or weakness.  The pain is intermittent 0-8/10.  It is worse with looking down, standing, prolonged sitting, walking, afternoon and evening.  It is better using a pillow, lying on back and stomach, morning, and nighttime. He has not had any treatment.  His goals are to look down and play guitar, walk, and standing.  Pattern 1       Past Medical History:  No date: Allergy  No date: Bronchitis    No past surgical history on file.    Review of patient's family history indicates:      Social History    Socioeconomic History      Marital status: Single      Number of children: 0    Occupational History      Occupation:         Employer: [employer]    Tobacco Use      Smoking status: Former        Types: Cigarettes        Quit date: 2019        Years since quittin.2      Smokeless tobacco: Former    Substance and Sexual Activity      Alcohol use: Yes        Alcohol/week: 2.0 standard drinks        Types: 2 Cans of beer per week      Drug use: No      Sexual activity: Not Currently    Social History Narrative       - 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       - he walks the dog 1 to 1-1/2 miles daily and lifts weights twice per week.      Current Outpatient Medications:  cholecalciferol, vitamin D3, (VITAMIN D3) 25 mcg (1,000 unit)  capsule, Take 1,000 Units by mouth once daily., Disp: , Rfl:   levocetirizine (XYZAL) 5 MG tablet, Take 5 mg by mouth nightly as needed., Disp: , Rfl:   multivitamin with minerals tablet, Take 1 tablet by mouth once daily., Disp: , Rfl:   phentermine (ADIPEX-P) 37.5 mg tablet, Take 1/2-1 tablet each morning, Disp: 30 tablet, Rfl: 2    No current facility-administered medications for this visit.      Review of patient's allergies indicates:   -- Singulair [montelukast]     --  cough      Review of Systems   Constitutional: Negative for weight gain and weight loss.   Cardiovascular:  Positive for chest pain (from back).   Respiratory:  Negative for shortness of breath.    Musculoskeletal:  Positive for neck pain (upper back midline and to the left). Negative for back pain, joint pain and joint swelling.   Gastrointestinal:  Negative for abdominal pain, bowel incontinence, nausea and vomiting.   Genitourinary:  Negative for bladder incontinence.   Neurological:  Negative for numbness and paresthesias.      Objective:     General: Curtis is well-developed, well-nourished, appears stated age, in no acute distress, alert and oriented to time, place and person.     General    Vitals reviewed.  Constitutional: He is oriented to person, place, and time. He appears well-developed and well-nourished.   HENT:   Head: Normocephalic and atraumatic.   Pulmonary/Chest: Effort normal.   Neurological: He is alert and oriented to person, place, and time.   Psychiatric: He has a normal mood and affect. His behavior is normal. Judgment and thought content normal.     General Musculoskeletal Exam   Gait: normal     Right Ankle/Foot Exam     Tests   Heel Walk: able to perform  Tiptoe Walk: able to perform    Left Ankle/Foot Exam     Tests   Heel Walk: able to perform  Tiptoe Walk: able to perform  Back (L-Spine & T-Spine) / Neck (C-Spine) Exam     Tenderness Posterior midline palpation reveals tenderness of the Lower C-Spine.     Neck  (C-Spine) Range of Motion   Flexion:      40  Extension:  40 (with pain)  Right Lateral Bend: 20   Left Lateral Bend: 20   Right Rotation: 40   Left Rotation: 40     Spinal Sensation   Right Side Sensation  C-Spine Level: normal   L-Spine Level: normal  S-Spine Level: normal  Left Side Sensation  C-Spine Level: normal  L-Spine Level: normal  S-Spine Level: normal    Back (L-Spine & T-Spine) Tests   Right Side Tests  Straight leg raise:        Sitting SLR: > 70 degrees    Left Side Tests  Straight leg raise:       Sitting SLR: > 70 degrees      Other   He has no scoliosis .  Spinal Kyphosis:  Absent      Muscle Strength   Right Upper Extremity   Biceps: 5/5   Deltoid:  5/5  Triceps:  5/5  Wrist extension: 5/5   Finger Flexors:  5/5  Left Upper Extremity  Biceps: 5/5   Deltoid:  5/5  Triceps:  5/5  Wrist extension: 5/5   Finger Flexors:  5/5  Right Lower Extremity   Hip Flexion: 5/5   Quadriceps:  5/5   Anterior tibial:  5/5   EHL:  5/5  Left Lower Extremity   Hip Flexion: 5/5   Quadriceps:  5/5   Anterior tibial:  5/5   EHL:  5/5    Reflexes     Left Side  Biceps:  2+  Triceps:  2+  Brachioradialis:  2+  Achilles:  2+  Left Back's Sign:  Absent  Babinski Sign:  absent  Quadriceps:  2+    Right Side   Biceps:  2+  Triceps:  2+  Brachioradialis:  2+  Achilles:  2+  Right Back's Sign:  absent  Babinski Sign:  absent  Quadriceps:  2+    Vascular Exam     Right Pulses        Carotid:                  2+    Left Pulses        Carotid:                  2+        Assessment:     1. Neck pain    2. Other cervical disc degeneration at C4-C5 level         Plan:     Orders Placed This Encounter    Ambulatory referral/consult to [facility]     The patient has had a history of neck pain with limitations in there activities of Daily living    Previous treatment has not provided relief.    The situation was discussed at length with the patient.  More than 50% of the total time of 45 minutes was spent in counseling.   We discussed different causes of neck pain and different treatment options.  We discussed the importance of stretching and strengthening.  We discussed posture sitting and getting head over spine.  We discussed the pros and cons of further diagnostic testing, alternative treatment and Medications    Based on the history, physical exam, and functional index, an active physical therapy program is recommended.  The goal is to restore the patients function and reduce pain.  A program of progressive resistance exercises, biomechanical, and mobility maneuvers, instructions in proper body mechanics, aerobic conditioning and HEP will be utilized. The program will continue as long as making improvements.    An assessment of patients progress will be made at each visit to document change in status.    The patient will be actively involved in there own treatment, and responsible for appointments and home program    The patient's cervical isometric strength will be tested and they will be placed in a program of isolated strength training based on 50% of their total functional torque and advanced as clinically appropriate.      Directional preference of pain will further influence the patients active rehabilitation program    The patient was instructed there might be an initial increase in discomfort    They are enrolled in cervical program with good prognosis  Pattern 1  Not a program patient och 3      Follow-up: No follow-ups on file. If there are any questions prior to this, the patient was instructed to contact the office.

## 2023-04-19 ENCOUNTER — CLINICAL SUPPORT (OUTPATIENT)
Dept: REHABILITATION | Facility: OTHER | Age: 33
End: 2023-04-19
Payer: COMMERCIAL

## 2023-04-19 DIAGNOSIS — M54.2 NECK PAIN: ICD-10-CM

## 2023-04-19 DIAGNOSIS — M50.321 OTHER CERVICAL DISC DEGENERATION AT C4-C5 LEVEL: ICD-10-CM

## 2023-04-19 DIAGNOSIS — R29.3 BAD POSTURE: ICD-10-CM

## 2023-04-19 PROCEDURE — 97161 PT EVAL LOW COMPLEX 20 MIN: CPT

## 2023-04-19 PROCEDURE — 97110 THERAPEUTIC EXERCISES: CPT

## 2023-04-19 PROCEDURE — 97112 NEUROMUSCULAR REEDUCATION: CPT

## 2023-04-24 PROBLEM — R29.3 BAD POSTURE: Status: ACTIVE | Noted: 2023-04-24

## 2023-04-24 NOTE — PLAN OF CARE
[facility] - PHYSICAL THERAPY EVALUATION     Name: Curtis Moseley  Clinic Number: [id number]    Therapy Diagnosis:   Encounter Diagnoses   Name Primary?    Other cervical disc degeneration at C4-C5 level     Neck pain     Bad posture      Physician: Elsie Paredes, *    Physician Orders: PT Eval and Treat   Medical Diagnosis from Referral:   M50.321 (ICD-10-CM) - Other cervical disc degeneration at C4-C5 level   M54.2 (ICD-10-CM) - Neck pain     Evaluation Date: 4/19/2023  Authorization Period Expiration: 12/31/2023  Plan of Care Expiration: 7/19/2023  Reassessment Due: 5/19/2023  Visit # / Visits authorized: 1/ 1    Time In: 2:07  Time Out: 3:30  Total Billable Time: 83 minutes  Insurance: Fee for service Insurance Patient    Precautions: Standard    Pattern of pain determined: 1REN    Subjective   Date of onset: chronic   History of current condition - Salvatore reports: neck and upper back pain. His pain mostly comes when he is still or with prolonged looking down. Feels his best in the morning when he first wakes up, and it gets progressively worse throughout the day. Does not have difficulty with lifting things. Has been doing chin tucks at home. Has a history of B rotator cuff injuries. Used work out more intensely at a gym, but now mostly works out at home- has dumbbells and resistance bands he uses. Has been trying to pay more attention to his posture lately to help manage pain. Plays guitar in his free time, has a electric guitar, and it hurts his neck to look down at his guitar. He recently got a lumbar roll for his desk.     Medical History:   Past Medical History:   Diagnosis Date    Allergy     Bronchitis        Surgical History:   Curtis Moseley  has no past surgical history on file.    Medications:   Curtis has a current medication list which includes the following prescription(s): cholecalciferol (vitamin d3), levocetirizine, multivitamin with minerals, and  phentermine.    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  Where is your pain the worst? neck  2.  Is your pain constant or intermittent? intermittent  3.  Does bending forward make your typical pain worse? yes  4.  Since the start of your neck pain, has there been a change in your bowel or bladder? none  5.  What can't you do now that you use to be able to do? Throw a baseball      Pts goals: relieve pain, fix posture      Red Flag Screening:   Cough  Sneeze  Strain: (--)  Bladder/ bowel: (--)  Falls: (--)  Night pain: (--)  Unexplained weight loss: (--)    OBJECTIVE   Postural examination/scapula alignment: Rounded shoulder and Head forward  Joint integrity: Firm end feeling- hypomobility with lower cervical P/A and upglides  Skin integrity: normal appearance  Edema: none noted  Correction of posture: better with lumbar roll    Range of Motion - MOVEMENT LOSS    ROM Loss   Flexion minimal loss   Extension within functional limits   Side bending Right within functional limits   Side bending Left within functional limits   Rotation Right within functional limits   Rotation Left within functional limits   Protraction within functional limits   Retraction  within  "functional limits     UE motion even B    Upper Extremity Strength  (R) UE  (L) UE    Shoulder flexion: 5/5 Shoulder flexion: 5/5   Shoulder Abduction: 5/5 Shoulder abduction: 5/5   Elbow flexion: 5/5 Elbow flexion: 5/5   Elbow extension: 5/5 Elbow extension: 5/5     NEUROLOGICAL SCREEN    Sensory deficit: light touch grossly intact    Special Tests:   Test Name  Testing Result   Compression (--)   Distraction (--)   Neural Tension Test (--)   Saddle Sensation (--)     Reflexes:    Left Right   Biceps  2+ 2+   Brachioradialis  2+ 2+   Clonus (--) (--)   Babinski (--) (--)       REPEATED TEST MOVEMENTS:   Repeated Protraction in Sitting no worse  no better   Repeated Flexion in Sitting no worse  no better   Repeated Retraction in Sitting  no worse  no better   Repeated Retraction Extension in Sitting worse   Repeated Flexion in lying worse   Repeated Retraction in lying no worse  no better   Repeated Retraction Extension in lying worse       Baseline Isometric Testing on Med X equipment:  Testing administered by PT  Date of testin2023  ROM  deg   Max Peak Torque 460    Min Peak Torque 205    Flex/Ext Ratio 2.2:1   % above normative data 14%     *pain at 36 deg and 24 degs with isometric testing*    GAIT:  Assistive Device used: none  Level of Assistance: independent  Patient displays the following gait deviations:  no gait deviations observed.         Limitation/Restriction for FOTO Neck Survey    Therapist reviewed FOTO scores for Curtis Moseley on 2023.   FOTO documents entered into [employer] - see Media section.    Limitation Score: 28%             Treatment   Treatment Time In: 2:30  Treatment Time Out: 3:00  Total Treatment time separate from Evaluation: 30 minutes    Curtis received therapeutic exercises to develop/improve posture, lumbar/cervical ROM, strength and muscular endurance for 10 minutes including the following exercises:   Upper trap stretch 20"  Levator stretch 20"  Scap " retractions 10x (encouraged to use resistance bands at home)  Chin tucks seated    [facility] 4/21/2023   Visit Number 1   VAS Pain Rating 3   Retraction in Sitting 10   Cervical Extension Seat Pad 0   Seat Adjustment 214   Top Dead Center 66   Counterweight 1.7   Cervical Flexion 102   Cervical Extension 24   Cervical Peak Torque 460   Ice - Z Lie (in min.) 5         Curtis received the following manual therapy techniques:  were applied to the: neck for 0 minutes.       Curtis received neuromuscular education  to engage spinal musculature correctly for motor control, and engagement of musculature  for 15 minutes including the MedX exercise component and practice and standard testing.  Med x dynamic exercise and baseline IM test performed with instructions to guide the patient safely through the isometric testing.  Patient informed to perform isometric test correctly, and safely, building best force they safely can and not pushing through pain.  Patient demonstrated understanding of information     Written Home Exercises Provided: yes.  HEP AS FOLLOWS:  Chin tucks  Levator stretch  Upper trap stretch  Scapular retraction  Exercises were reviewed and Salvatore was able to demonstrate them prior to the end of the session.  Salvatore demonstrated good  understanding of the education provided.     See EMR under Patient Instructions for exercises provided 4/19/2023.      Education provided:   - Patient received education regarding proper posture and body mechanics.  Patient was given top [facility] Visit 1 handouts which discuss what to expect in therapy, the purpose and opportunity for health coaching, the program,  wellness when discharged from therapy, back education and care specifically for posture seated, standing, lifting correctly, components of exercise, importance of nutrition and hydration, and importance of sleep.   Information on lumbar rolls provided.  Patient received education regarding  proper posture and body mechanics.  - Tamela roll tried, recommended, and purchase information was provided.    - Patient received a handout regarding anticipated muscular soreness following the isometric test and strategies for management were reviewed with patient including stretching, using ice and scheduled rest.   - Patient received education on the [facility] program, purpose of the isometric test, progression of neck strengthening as well as wellness approach and systemic strengthening.  Details of the program were discussed.  Reviewed that patient should feel support/pressure from med ex restraints but no pain or discomfort and patient expressed understanding.  Med x dynamic exercise and baseline IM test performed with instructions to guide the patient safely through the isometric testing.  Patient informed to perform isometric test correctly, and safely, building best force they safely can and not pushing through pain.  Patient demonstrated understanding of information        Salvatore received cold pack for 10 minutes to neck.    Assessment   Curtis is a 32 y.o. male referred to [facility] with a medical diagnosis of Other cervical disc degeneration at C4-C5 level, and neck pain. Pt presents with a chief complaint of pain in his neck/shoulder area, especially with sustained flexion. He was noted to have overall good range of motion, though with increased pain in extension with isometric testing in extension. He also was noted to have good strength with cervical medX testing, though with increased pain farther into extension. He would benefit from skilled physical therapy to improve stability and reduce pain with cervical extension, he would also benefit from increasing his deep neck flexor stability to improve tolerance for ADLs.    Pain Pattern: 1REN       Pt prognosis is Excellent.   Pt will benefit from skilled outpatient Physical Therapy to address the deficits stated above and in the chart  below, provide pt/family education, and to maximize pt's level of independence.     Plan of care discussed with patient: Yes  Pt's spiritual, cultural and educational needs considered and patient is agreeable to the plan of care and goals as stated below:     Anticipated Barriers for therapy: none    PT Evaluation Completed? Yes    Medical necessity is demonstrated by the following problem list.    Pt presents with the following impairments:     History  Co-morbidities and personal factors that may impact the plan of care Co-morbidities:   high BMI    Personal Factors:   no deficits     low   Examination  Body Structures and Functions, activity limitations and participation restrictions that may impact the plan of care Body Regions:   neck    Body Systems:    strength  posture    Participation Restrictions:   Difficulty participating in his usual recreational activities     Activity limitations:   Learning and applying knowledge  no deficits    General Tasks and Commands  no deficits    Communication  no deficits    Mobility  no deficits    Self care  no deficits    Domestic Life  doing house work (cleaning house, washing dishes, laundry)    Interactions/Relationships  no deficits    Life Areas  employment    Community and Social Life  recreation and leisure         low   Clinical Presentation stable and uncomplicated low   Decision Making/ Complexity Score: low       GOALS: Pt is in agreement with the following goals.    Short term goals: 6 weeks or 10 visits   1.  Pt will demonstratte maintained cervical ROM for ease with ADLs and driving  (approp and ongoing)  2. Pt will demonstrate independence with reducing or controlling symptoms with ther ex, movement, or position independently, able to reduce pain 1-2 points on pain scale using strategies taught in therapy  (approp and ongoing)  3. Pt will demonstrate increased MedX average isometric strength value by 5% with  when compared to the initial testing resulting  "in improved ability to perform bending, lifting, and carrying activities safely, confidently.  (approp and ongoing)        Long term goals: 10 weeks or 20 visits  1. Pt will demonstratte increased cervical ROM as measured by med ex by 3 degrees from initial test which results in functional ROM of neck for ease with ADLs and driving  (approp and ongoing)  2. Pt will demonstrate increased MedX average isometric strength value  by 10% from initial test to improve ability to lift and carry, and sustain good posture while performing ADL's  (approp and ongoing)  3.Pt will demonstrate reduced pain and improved functional outcomes as reported on the FOTO by reaching a limitation score of < or = 20% or less in order to demonstrate subjective improvement in pt's condition.    (approp and ongoing)  4. Pt will demonstrate independence with reducing or controlling symptoms with ther ex, movement, or position independently, able to reduce pain 2-4 points on pain scale using strategies taught in therapy  (approp and ongoing)  5. Pt will demonstrate independence with the HEP at discharge.   (approp and ongoing)  6.  Pt will be able to play his guitar for at least 30 mins without pain.  (approp and ongoing)    Plan   Outpatient physical therapy 2x week for 10 weeks or 20 visits to include the following:   - Patient education  - Therapeutic exercise  - Manual therapy  - Performance testing   - Neuromuscular Re-education  - Therapeutic activity   - Modalities    Pt may be seen by PTA as part of the rehabilitation team.     Therapist: Kamar Tillman, PT  4/24/2023      "I certify the need for these services furnished under this plan of treatment and while under my care."    ____________________________________  Physician/Referring Practitioner    _______________  Date of Signature      "

## 2023-04-28 ENCOUNTER — OFFICE VISIT (OUTPATIENT)
Dept: INTERNAL MEDICINE | Facility: CLINIC | Age: 33
End: 2023-04-28
Attending: INTERNAL MEDICINE
Payer: COMMERCIAL

## 2023-04-28 VITALS
BODY MASS INDEX: 33.4 KG/M2 | HEIGHT: 73 IN | WEIGHT: 252 LBS | DIASTOLIC BLOOD PRESSURE: 70 MMHG | OXYGEN SATURATION: 99 % | SYSTOLIC BLOOD PRESSURE: 122 MMHG | HEART RATE: 73 BPM

## 2023-04-28 DIAGNOSIS — Z00.00 ROUTINE ADULT HEALTH MAINTENANCE: Primary | ICD-10-CM

## 2023-04-28 DIAGNOSIS — E66.9 OBESITY (BMI 30.0-34.9): ICD-10-CM

## 2023-04-28 PROBLEM — E66.811 OBESITY (BMI 30.0-34.9): Status: ACTIVE | Noted: 2021-05-05

## 2023-04-28 PROCEDURE — 3078F DIAST BP <80 MM HG: CPT | Mod: CPTII,S$GLB,, | Performed by: INTERNAL MEDICINE

## 2023-04-28 PROCEDURE — 1160F RVW MEDS BY RX/DR IN RCRD: CPT | Mod: CPTII,S$GLB,, | Performed by: INTERNAL MEDICINE

## 2023-04-28 PROCEDURE — 3074F SYST BP LT 130 MM HG: CPT | Mod: CPTII,S$GLB,, | Performed by: INTERNAL MEDICINE

## 2023-04-28 PROCEDURE — 99395 PREV VISIT EST AGE 18-39: CPT | Mod: S$GLB,,, | Performed by: INTERNAL MEDICINE

## 2023-04-28 PROCEDURE — 3044F PR MOST RECENT HEMOGLOBIN A1C LEVEL <7.0%: ICD-10-PCS | Mod: CPTII,S$GLB,, | Performed by: INTERNAL MEDICINE

## 2023-04-28 PROCEDURE — 3074F PR MOST RECENT SYSTOLIC BLOOD PRESSURE < 130 MM HG: ICD-10-PCS | Mod: CPTII,S$GLB,, | Performed by: INTERNAL MEDICINE

## 2023-04-28 PROCEDURE — 99395 PR PREVENTIVE VISIT,EST,18-39: ICD-10-PCS | Mod: S$GLB,,, | Performed by: INTERNAL MEDICINE

## 2023-04-28 PROCEDURE — 1159F MED LIST DOCD IN RCRD: CPT | Mod: CPTII,S$GLB,, | Performed by: INTERNAL MEDICINE

## 2023-04-28 PROCEDURE — 3008F BODY MASS INDEX DOCD: CPT | Mod: CPTII,S$GLB,, | Performed by: INTERNAL MEDICINE

## 2023-04-28 PROCEDURE — 3078F PR MOST RECENT DIASTOLIC BLOOD PRESSURE < 80 MM HG: ICD-10-PCS | Mod: CPTII,S$GLB,, | Performed by: INTERNAL MEDICINE

## 2023-04-28 PROCEDURE — 1159F PR MEDICATION LIST DOCUMENTED IN MEDICAL RECORD: ICD-10-PCS | Mod: CPTII,S$GLB,, | Performed by: INTERNAL MEDICINE

## 2023-04-28 PROCEDURE — 3008F PR BODY MASS INDEX (BMI) DOCUMENTED: ICD-10-PCS | Mod: CPTII,S$GLB,, | Performed by: INTERNAL MEDICINE

## 2023-04-28 PROCEDURE — 1160F PR REVIEW ALL MEDS BY PRESCRIBER/CLIN PHARMACIST DOCUMENTED: ICD-10-PCS | Mod: CPTII,S$GLB,, | Performed by: INTERNAL MEDICINE

## 2023-04-28 PROCEDURE — 3044F HG A1C LEVEL LT 7.0%: CPT | Mod: CPTII,S$GLB,, | Performed by: INTERNAL MEDICINE

## 2023-04-28 RX ORDER — PHENTERMINE HYDROCHLORIDE 37.5 MG/1
TABLET ORAL
Qty: 30 TABLET | Refills: 1 | Status: SHIPPED | OUTPATIENT
Start: 2023-04-28 | End: 2023-06-28 | Stop reason: SDUPTHER

## 2023-04-28 NOTE — PROGRESS NOTES
Subjective     Patient ID: Curtis Moseley is a 32 y.o. male.    Chief Complaint: Follow-up (Refill Adipex )    He has lost another 36 lb in the past 3 months.  He is lost a total of slightly over 100 lb in 9 months.  He has been out of Adipex for 3 days.  He continues to follow weight watchers.      Adult Wellness Exam:    Mental Conditions: None  Depression Risk Factors: None  BMI: See Vital signs   Colon screen:    See Health Maintenance Report                                   Vaccines (Flu, Adacel, Shingrix): See Health Maintenance Report  Routine labs (Cholesterol, Glucose/Hgb A1C, and TSH): Done    The patient's current health status is: Good   Patient was educated on routine health maintenance. See Patient Instructions.                               Follow-up    Review of Systems   Constitutional: Negative.    Respiratory: Negative.     Cardiovascular: Negative.         Objective     Physical Exam  Vitals and nursing note reviewed.   Constitutional:       Appearance: He is well-developed.   HENT:      Head: Normocephalic and atraumatic.   Eyes:      Pupils: Pupils are equal, round, and reactive to light.   Cardiovascular:      Rate and Rhythm: Normal rate and regular rhythm.      Heart sounds: Normal heart sounds.   Pulmonary:      Effort: Pulmonary effort is normal.   Neurological:      Mental Status: He is alert.          Assessment and Plan     Problem List Items Addressed This Visit          Unprioritized    Obesity (BMI 30.0-34.9)     Other Visit Diagnoses       Routine adult health maintenance    -  Primary            Per orders and D/C instructions.   He wants to try changing Adipex to as needed for a weight gain of 5 lb or more.   Target wt is 200-210 lb.

## 2023-05-12 NOTE — PROGRESS NOTES
[facility]  Physical Therapy Treatment Note     Name: Curtis Moseley  Clinic Number: [id number]    Therapy Diagnosis:   Encounter Diagnosis   Name Primary?    Bad posture Yes     Physician: Elsie Paredes, *    Visit Date: 5/15/2023    Physician Orders: PT Eval and Treat   Medical Diagnosis from Referral:   M50.321 (ICD-10-CM) - Other cervical disc degeneration at C4-C5 level   M54.2 (ICD-10-CM) - Neck pain   Evaluation Date: 2023  Authorization Period Expiration: 2023  Reassessment Due: 2023  Plan of Care Certification Period: 2023  Visit #/Visits authorized:      PTA Visit #: 0/5     Time In: 1038  Time Out: 1138  Total Billable Time: 55 minutes  INSURANCE and OUTCOMES: Fee for Service with FOTO Outcomes 1/3    Precautions: standard    Pattern of pain determined: 1REN --> 1REP    Subjective     Curtis reports that his neck has been bothering him slightly more after using hotel pillows this weekend.  Feeling tender and tight    Patient reports tolerating previous visit well  Patient reports their pain to be 7/10 on a 0-10 scale with 0 being no pain and 10 being the worst pain imaginable.  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  Pt goals: relieve pain; fix posture    Objective      Baseline Isometric Testing on Med X equipment:  Testing administered by PT  Date of testin2023  ROM  deg   Max Peak Torque 460    Min Peak Torque 205    Flex/Ext Ratio 2.2:1   % above normative data 14%     Outcomes:  Limitation Score: 28%  Visit 5 Score:   Visit 10 Score:   Discharge Score:   Goal: 20%    Range of Motion - MOVEMENT ASSESSMENT    ROM 2023 ROM 05/15/2023   Flexion minimal loss Minimal loss  tightness   Extension within functional limits WFL   Side bending Right within functional limits Minimal loss   tightness   Side bending  "Left within functional limits Minimal loss   tightness   Rotation Right within functional limits WFL   Tightness and pulling on the left   Rotation Left within functional limits WFL    Protraction within functional limits WFL  tightness   Retraction  within functional limits WFL  Tightness         REPEATED TEST MOVEMENTS:   Repeated Protraction in Sitting no worse  no better   Repeated Flexion in Sitting no worse  no better   Repeated Retraction in Sitting  no worse  no better   Repeated Retraction Extension in Sitting worse   Repeated Flexion in lying worse   Repeated Retraction in lying no worse  no better   Repeated Retraction Extension in lying worse       Treatment     Salvatore received the treatments listed below:      Salvatore received neuromuscular education for 10 minutes via participation on the [employer]. Therapist assisted patient in isolating and engaging spinal stabilization musculature in order to improve functional ability and postural control. Patient performed exercise with therapist guidance in order to accurately use pacer function, avoid valsalva, and optimally exert effort within a safe and effective range via the [place] Exertion Rating Scale. Patient instructed to perform at a midrange of exertion and to complete 15-20 repetitions within appropriate split time, with proper technique, and while maintaining safety.     [facility] 5/15/2023   Visit Number 2   VAS Pain Rating 7   Retraction in Sitting 10   Retraction with Extension 10   Rotation 10   Cervical Extension Seat Pad -   Seat Adjustment -   Top Dead Center -   Counterweight -   Cervical Flexion -   Cervical Extension -   Cervical Peak Torque -   Cervical Weight 315   Repetitions 20   Rating of Perceived Exertion 3   Ice - Z Lie (in min.) 5     Salvatore participated in therapeutic exercises to develop strength, endurance, ROM, flexibility, posture, and core stabilization for 45 minutes including:    Upper trap stretch 20"  Levator " "stretch 20"  Cervical retraction with overpressure x10   +Cervical retraction with extension 2x10  +Cervical extension supine head off table x 1min  +Seated thoracic extension     Peripheral muscle strengthening which included one set of 15-20 repetitions at a slow and controlled 10-13 second per rep pace focused on strengthening supporting musculature in order to improve body mechanics and functional mobility. Patient and therapist focused on proper form during treatment to ensure optimal strengthening of each targeted muscle group.  Machines utilized include torso rotation, leg extension, leg curl, chest press, upright row. Tricep extension, bicep curl, leg press, and hip abduction added visit 3    Pt given cold pack for 5 minutes to cervical and thoracic spine in z lying.    Patient Education and Home Exercises     Home exercises include:  Upper trap stretch   Levator stretch   Cervical retraction  Cardio program (V5):   Lifting education (V11):   Fridge Magnet Discharge handout (date given): -    Education provided:   - PT role and POC  - HEP    Written Home Exercises Provided: Patient instructed to cont prior HEP.  Exercises were reviewed and Salvatore was able to demonstrate them prior to the end of the session.  Salvatore demonstrated good  understanding of the education provided.     See EMR under Patient Instructions for exercises provided prior visit.    Assessment   Salvatore reports to clinic for 2nd healthy back session with increase in symptoms in the neck following sleeping at a hotel this weekend and having different pillows. Reviewed home program and educated on form and sequencing. Today, continued with mobility activities, emphasizing extension with good effect. Felt relief of symptoms with sustained extension in supine and with retraction and extension. On MedX started at 315 in-lbs for 20 repetitions rating 3/10. Continue to progress as tolerated.     Patient is making good progress towards established " goals.  Pt will continue to benefit from skilled outpatient physical therapy to address the deficits stated in the impairment chart, provide pt/family education and to maximize pt's level of independence in the home and community environment.     Anticipated Barriers for therapy: None     Pt's spiritual, cultural and educational needs considered and pt agreeable to plan of care and goals as stated below:     GOALS: Pt is in agreement with the following goals.     Short term goals: 6 weeks or 10 visits   1.  Pt will demonstratte maintained cervical ROM for ease with ADLs and driving  (approp and ongoing)  2. Pt will demonstrate independence with reducing or controlling symptoms with ther ex, movement, or position independently, able to reduce pain 1-2 points on pain scale using strategies taught in therapy  (approp and ongoing)  3. Pt will demonstrate increased MedX average isometric strength value by 5% with  when compared to the initial testing resulting in improved ability to perform bending, lifting, and carrying activities safely, confidently.  (approp and ongoing)    Long term goals: 10 weeks or 20 visits  1. Pt will demonstratte increased cervical ROM as measured by med ex by 3 degrees from initial test which results in functional ROM of neck for ease with ADLs and driving  (approp and ongoing)  2. Pt will demonstrate increased MedX average isometric strength value  by 10% from initial test to improve ability to lift and carry, and sustain good posture while performing ADL's  (approp and ongoing)  3.Pt will demonstrate reduced pain and improved functional outcomes as reported on the FOTO by reaching a limitation score of < or = 20% or less in order to demonstrate subjective improvement in pt's condition.    (approp and ongoing)  4. Pt will demonstrate independence with reducing or controlling symptoms with ther ex, movement, or position independently, able to reduce pain 2-4 points on pain scale using  strategies taught in therapy  (approp and ongoing)  5. Pt will demonstrate independence with the HEP at discharge.   (approp and ongoing)  6.  Pt will be able to play his guitar for at least 30 mins without pain.  (approp and ongoing)    Plan     Continue with established Plan of Care towards established PT goals.     Therapist: Theresa Gan, PT  5/12/2023

## 2023-05-15 ENCOUNTER — CLINICAL SUPPORT (OUTPATIENT)
Dept: REHABILITATION | Facility: OTHER | Age: 33
End: 2023-05-15
Payer: COMMERCIAL

## 2023-05-15 DIAGNOSIS — R29.3 BAD POSTURE: Primary | ICD-10-CM

## 2023-05-15 PROCEDURE — 97112 NEUROMUSCULAR REEDUCATION: CPT

## 2023-05-15 PROCEDURE — 97110 THERAPEUTIC EXERCISES: CPT

## 2023-05-17 ENCOUNTER — CLINICAL SUPPORT (OUTPATIENT)
Dept: REHABILITATION | Facility: OTHER | Age: 33
End: 2023-05-17
Payer: COMMERCIAL

## 2023-05-17 DIAGNOSIS — R29.3 BAD POSTURE: Primary | ICD-10-CM

## 2023-05-17 PROCEDURE — 97110 THERAPEUTIC EXERCISES: CPT

## 2023-05-17 PROCEDURE — 97112 NEUROMUSCULAR REEDUCATION: CPT

## 2023-05-17 NOTE — PROGRESS NOTES
[facility]  Physical Therapy Treatment Note     Name: Curtis Moseley  Clinic Number: [id number]    Therapy Diagnosis:   Encounter Diagnosis   Name Primary?    Bad posture Yes     Physician: Elsie Paredes, *    Visit Date: 2023    Physician Orders: PT Eval and Treat   Medical Diagnosis from Referral:   M50.321 (ICD-10-CM) - Other cervical disc degeneration at C4-C5 level   M54.2 (ICD-10-CM) - Neck pain   Evaluation Date: 2023  Authorization Period Expiration: 2023  Reassessment Due: 2023  Plan of Care Certification Period: 2023  Visit #/Visits authorized: 3/20     PTA Visit #: 0/5     Time In: 1300  Time Out: 1400  Total Billable Time: 55 minutes  INSURANCE and OUTCOMES: Fee for Service with FOTO Outcomes 1/3    Precautions: standard    Pattern of pain determined: 1REN --> 1REP    Subjective     Curtis reports that he has some soreness in his neck today, but more of a muscular fatigue rather than pain.     Patient reports tolerating previous visit well  Patient reports their pain to be 5/10 on a 0-10 scale with 0 being no pain and 10 being the worst pain imaginable.  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  Pt goals: relieve pain; fix posture    Objective      Baseline Isometric Testing on Med X equipment:  Testing administered by PT  Date of testin2023  ROM  deg   Max Peak Torque 460    Min Peak Torque 205    Flex/Ext Ratio 2.2:1   % above normative data 14%     Outcomes:  Limitation Score: 28%  Visit 5 Score:   Visit 10 Score:   Discharge Score:   Goal: 20%    Range of Motion - MOVEMENT ASSESSMENT    ROM 2023 ROM 05/15/2023   Flexion minimal loss Minimal loss  tightness   Extension within functional limits WFL   Side bending Right within functional limits Minimal loss   tightness   Side bending Left within functional  "limits Minimal loss   tightness   Rotation Right within functional limits WFL   Tightness and pulling on the left   Rotation Left within functional limits WFL    Protraction within functional limits WFL  tightness   Retraction  within functional limits WFL  Tightness         REPEATED TEST MOVEMENTS:   Repeated Protraction in Sitting no worse  no better   Repeated Flexion in Sitting no worse  no better   Repeated Retraction in Sitting  no worse  no better   Repeated Retraction Extension in Sitting worse   Repeated Flexion in lying worse   Repeated Retraction in lying no worse  no better   Repeated Retraction Extension in lying worse       Treatment     Salvatore received the treatments listed below:      Salvatore received neuromuscular education for 10 minutes via participation on the [email or web address]. Therapist assisted patient in isolating and engaging spinal stabilization musculature in order to improve functional ability and postural control. Patient performed exercise with therapist guidance in order to accurately use pacer function, avoid valsalva, and optimally exert effort within a safe and effective range via the Froil[place] Exertion Rating Scale. Patient instructed to perform at a midrange of exertion and to complete 15-20 repetitions within appropriate split time, with proper technique, and while maintaining safety.     [facility] 5/17/2023   Visit Number 3   VAS Pain Rating 4   Retraction in Sitting -   Retraction with Extension -   Rotation -   Cervical Extension Seat Pad -   Seat Adjustment -   Top Dead Center -   Counterweight -   Cervical Flexion -   Cervical Extension -   Cervical Peak Torque -   Cervical Weight 345   Repetitions 15   Rating of Perceived Exertion 5   Ice - Z Lie (in min.) 5        Salvatore participated in therapeutic exercises to develop strength, endurance, ROM, flexibility, posture, and core stabilization for 45 minutes including:    Upper trap stretch 20"  Levator stretch 20"  Cervical " "retraction with overpressure x10 and x10 in supine   Cervical retraction with extension 2x10  Cervical extension supine head off table x 1min  Seated thoracic extension 10x5"  +Scapular retractions 2x10x3"  +Cervical rotation with towel over pressure 8x5" ea   +No money RTB 2x10x3"    Peripheral muscle strengthening which included one set of 15-20 repetitions at a slow and controlled 10-13 second per rep pace focused on strengthening supporting musculature in order to improve body mechanics and functional mobility. Patient and therapist focused on proper form during treatment to ensure optimal strengthening of each targeted muscle group.  Machines utilized include torso rotation, leg extension, leg curl, chest press, upright row. Tricep extension, bicep curl, leg press, and hip abduction added visit 3    Pt given cold pack for 5 minutes to cervical and thoracic spine in z lying.    Patient Education and Home Exercises     Home exercises include:  Upper trap stretch   Levator stretch   Cervical retraction  Cardio program (V5):   Lifting education (V11):   Fridge Magnet Discharge handout (date given): -    Education provided:   - PT role and POC  - HEP    Written Home Exercises Provided: Patient instructed to cont prior HEP.  Exercises were reviewed and Salvatore was able to demonstrate them prior to the end of the session.  Salvatore demonstrated good  understanding of the education provided.     See EMR under Patient Instructions for exercises provided prior visit.    Assessment   Curtis tolerated treatment well today. Reports that he does not have pain today, just soreness. Continued prior cervical mobility and stability exercises and progressed treatment by adding periscapular and postural exercises. Patient able to perform 15 reps at increased resistance on MedX machine with appropriate exertion. Will increase reps NV. Continues to tolerate peripheral exercises well. Will continue to progress as able.      Patient is " making good progress towards established goals.  Pt will continue to benefit from skilled outpatient physical therapy to address the deficits stated in the impairment chart, provide pt/family education and to maximize pt's level of independence in the home and community environment.     Anticipated Barriers for therapy: None     Pt's spiritual, cultural and educational needs considered and pt agreeable to plan of care and goals as stated below:     GOALS: Pt is in agreement with the following goals.     Short term goals: 6 weeks or 10 visits   1.  Pt will demonstratte maintained cervical ROM for ease with ADLs and driving  (approp and ongoing)  2. Pt will demonstrate independence with reducing or controlling symptoms with ther ex, movement, or position independently, able to reduce pain 1-2 points on pain scale using strategies taught in therapy  (approp and ongoing)  3. Pt will demonstrate increased MedX average isometric strength value by 5% with  when compared to the initial testing resulting in improved ability to perform bending, lifting, and carrying activities safely, confidently.  (approp and ongoing)    Long term goals: 10 weeks or 20 visits  1. Pt will demonstratte increased cervical ROM as measured by med ex by 3 degrees from initial test which results in functional ROM of neck for ease with ADLs and driving  (approp and ongoing)  2. Pt will demonstrate increased MedX average isometric strength value  by 10% from initial test to improve ability to lift and carry, and sustain good posture while performing ADL's  (approp and ongoing)  3.Pt will demonstrate reduced pain and improved functional outcomes as reported on the FOTO by reaching a limitation score of < or = 20% or less in order to demonstrate subjective improvement in pt's condition.    (approp and ongoing)  4. Pt will demonstrate independence with reducing or controlling symptoms with ther ex, movement, or position independently, able to reduce  pain 2-4 points on pain scale using strategies taught in therapy  (approp and ongoing)  5. Pt will demonstrate independence with the HEP at discharge.   (approp and ongoing)  6.  Pt will be able to play his guitar for at least 30 mins without pain.  (approp and ongoing)    Plan     Continue with established Plan of Care towards established PT goals.     Therapist: Chago Henriquez, PT  5/17/2023

## 2023-05-24 ENCOUNTER — CLINICAL SUPPORT (OUTPATIENT)
Dept: REHABILITATION | Facility: OTHER | Age: 33
End: 2023-05-24
Payer: COMMERCIAL

## 2023-05-24 DIAGNOSIS — R29.3 BAD POSTURE: Primary | ICD-10-CM

## 2023-05-24 PROCEDURE — 97750 PHYSICAL PERFORMANCE TEST: CPT | Mod: 32

## 2023-05-24 NOTE — PROGRESS NOTES
[facility]  Physical Therapy Treatment Note     Name: Curtis Moseley  Clinic Number: [id number]    Therapy Diagnosis:   Encounter Diagnosis   Name Primary?    Bad posture Yes       Physician: Elsie Paredes, *    Visit Date: 2023    Physician Orders: PT Eval and Treat   Medical Diagnosis from Referral:   M50.321 (ICD-10-CM) - Other cervical disc degeneration at C4-C5 level   M54.2 (ICD-10-CM) - Neck pain   Evaluation Date: 2023  Authorization Period Expiration: 2023  Reassessment Due: 2023  Plan of Care Certification Period: 2023  Visit #/Visits authorized:      PTA Visit #: 0/5     Time In: 11:25  Time Out: 12:00  Total Billable Time: 30 minutes  INSURANCE and OUTCOMES: Fee for Service with FOTO Outcomes 1/3    Precautions: standard    Pattern of pain determined: 1REN --> 1REP    Subjective     Curtis reports he is feeling good today. Running a little late because he got stuck in a meeting downstairs and could not get out of it on time.    Patient reports tolerating previous visit well  Patient reports their pain to be 5/10 on a 0-10 scale with 0 being no pain and 10 being the worst pain imaginable.  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  Pt goals: relieve pain; fix posture    Objective      Baseline Isometric Testing on Med X equipment:  Testing administered by PT  Date of testin2023  ROM  deg   Max Peak Torque 460    Min Peak Torque 205    Flex/Ext Ratio 2.2:1   % above normative data 14%     Outcomes:  Limitation Score: 28%  Visit 5 Score:   Visit 10 Score:   Discharge Score:   Goal: 20%    Range of Motion - MOVEMENT ASSESSMENT    ROM 2023 ROM 05/15/2023   Flexion minimal loss Minimal loss  tightness   Extension within functional limits WFL   Side bending Right within functional limits Minimal loss   tightness    Side bending Left within functional limits Minimal loss   tightness   Rotation Right within functional limits WFL   Tightness and pulling on the left   Rotation Left within functional limits WFL    Protraction within functional limits WFL  tightness   Retraction  within functional limits WFL  Tightness         REPEATED TEST MOVEMENTS:   Repeated Protraction in Sitting no worse  no better   Repeated Flexion in Sitting no worse  no better   Repeated Retraction in Sitting  no worse  no better   Repeated Retraction Extension in Sitting worse   Repeated Flexion in lying worse   Repeated Retraction in lying no worse  no better   Repeated Retraction Extension in lying worse       Treatment     Salvatore received the treatments listed below:      Salvatore received neuromuscular education for 10 minutes via participation on the [employer]. Therapist assisted patient in isolating and engaging spinal stabilization musculature in order to improve functional ability and postural control. Patient performed exercise with therapist guidance in order to accurately use pacer function, avoid valsalva, and optimally exert effort within a safe and effective range via the [place] Exertion Rating Scale. Patient instructed to perform at a midrange of exertion and to complete 15-20 repetitions within appropriate split time, with proper technique, and while maintaining safety.     [facility] 5/24/2023   Visit Number 4   VAS Pain Rating 4   Retraction in Sitting -   Retraction with Extension -   Rotation -   Cervical Extension Seat Pad -   Seat Adjustment -   Top Dead Center -   Counterweight -   Cervical Flexion -   Cervical Extension -   Cervical Peak Torque -   Cervical Weight 345   Repetitions 18   Rating of Perceived Exertion 4   Ice - Z Lie (in min.) 5           Salvatore participated in therapeutic exercises to develop strength, endurance, ROM, flexibility, posture, and core stabilization for 20 minutes including:  Not performed  "today due to time constraints, will resume next session: (pt performed some of these during meeting)  Upper trap stretch 20"  Levator stretch 20"  Cervical retraction with overpressure x10 and x10 in supine   Cervical retraction with extension 2x10  Cervical extension supine head off table x 1min  Seated thoracic extension 10x5"  +Scapular retractions 2x10x3"  +Cervical rotation with towel over pressure 8x5" ea   +No money RTB 2x10x3"    Peripheral muscle strengthening which included one set of 15-20 repetitions at a slow and controlled 10-13 second per rep pace focused on strengthening supporting musculature in order to improve body mechanics and functional mobility. Patient and therapist focused on proper form during treatment to ensure optimal strengthening of each targeted muscle group.  Machines utilized include torso rotation, leg extension, leg curl, chest press, upright row. Tricep extension, bicep curl, leg press, and hip abduction added visit 3    Pt given cold pack for 5 minutes to cervical and thoracic spine in z lying.    Patient Education and Home Exercises     Home exercises include:  Upper trap stretch   Levator stretch   Cervical retraction  Cardio program (V5):   Lifting education (V11):   [employer] Discharge handout (date given): -    Education provided:   - PT role and POC  - HEP    Written Home Exercises Provided: Patient instructed to cont prior HEP.  Exercises were reviewed and Salvatore was able to demonstrate them prior to the end of the session.  Salvatore demonstrated good  understanding of the education provided.     See EMR under Patient Instructions for exercises provided prior visit.    Assessment   Curtis arrived to therapy reporting low levels of pain in his neck lately, and improved tolerance for ADLs. Skipped table exercises due to time constraints, but pt reports having performed some stretches and exercises already this morning.  Patient able to perform 18 reps at 345 in/lbs on MedX " machine with RPE 4/10. Continues to tolerate peripheral exercises well. Will continue to progress as able.      Patient is making good progress towards established goals.  Pt will continue to benefit from skilled outpatient physical therapy to address the deficits stated in the impairment chart, provide pt/family education and to maximize pt's level of independence in the home and community environment.     Anticipated Barriers for therapy: None     Pt's spiritual, cultural and educational needs considered and pt agreeable to plan of care and goals as stated below:     GOALS: Pt is in agreement with the following goals.     Short term goals: 6 weeks or 10 visits   1.  Pt will demonstratte maintained cervical ROM for ease with ADLs and driving  (approp and ongoing)  2. Pt will demonstrate independence with reducing or controlling symptoms with ther ex, movement, or position independently, able to reduce pain 1-2 points on pain scale using strategies taught in therapy  (approp and ongoing)  3. Pt will demonstrate increased MedX average isometric strength value by 5% with  when compared to the initial testing resulting in improved ability to perform bending, lifting, and carrying activities safely, confidently.  (approp and ongoing)    Long term goals: 10 weeks or 20 visits  1. Pt will demonstratte increased cervical ROM as measured by med ex by 3 degrees from initial test which results in functional ROM of neck for ease with ADLs and driving  (approp and ongoing)  2. Pt will demonstrate increased MedX average isometric strength value  by 10% from initial test to improve ability to lift and carry, and sustain good posture while performing ADL's  (approp and ongoing)  3.Pt will demonstrate reduced pain and improved functional outcomes as reported on the FOTO by reaching a limitation score of < or = 20% or less in order to demonstrate subjective improvement in pt's condition.    (approp and ongoing)  4. Pt will  demonstrate independence with reducing or controlling symptoms with ther ex, movement, or position independently, able to reduce pain 2-4 points on pain scale using strategies taught in therapy  (approp and ongoing)  5. Pt will demonstrate independence with the HEP at discharge.   (approp and ongoing)  6.  Pt will be able to play his guitar for at least 30 mins without pain.  (approp and ongoing)    Plan     Continue with established Plan of Care towards established PT goals.     Therapist: Kamar Tillman, PT  5/24/2023

## 2023-05-26 ENCOUNTER — CLINICAL SUPPORT (OUTPATIENT)
Dept: REHABILITATION | Facility: OTHER | Age: 33
End: 2023-05-26
Payer: COMMERCIAL

## 2023-05-26 DIAGNOSIS — R29.3 BAD POSTURE: Primary | ICD-10-CM

## 2023-05-26 PROCEDURE — 97750 PHYSICAL PERFORMANCE TEST: CPT | Mod: 32

## 2023-05-26 NOTE — PROGRESS NOTES
[facility]  Physical Therapy Treatment Note     Name: Curtis Moseley  Clinic Number: [id number]    Therapy Diagnosis:   Encounter Diagnosis   Name Primary?    Bad posture Yes         Physician: Elsie Paredes, *    Visit Date: 2023    Physician Orders: PT Eval and Treat   Medical Diagnosis from Referral:   M50.321 (ICD-10-CM) - Other cervical disc degeneration at C4-C5 level   M54.2 (ICD-10-CM) - Neck pain   Evaluation Date: 2023  Authorization Period Expiration: 2023  Reassessment Due: 2023  Plan of Care Certification Period: 2023  Visit #/Visits authorized:      PTA Visit #: 0/5     Time In: 10:40am  Time Out: 11:30am  Total Billable Time: 45 minutes  INSURANCE and OUTCOMES: Fee for Service with FOTO Outcomes 1/3    Precautions: standard    Pattern of pain determined: 1REN --> 1REP    Subjective     Curtis reports pain is getting better, worse in the mornings, but stretches help.   Patient reports tolerating previous visit well  Patient reports their pain to be 3/10 on a 0-10 scale with 0 being no pain and 10 being the worst pain imaginable.  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  Pt goals: relieve pain; fix posture    Objective      Baseline Isometric Testing on Med X equipment:  Testing administered by PT  Date of testin2023  ROM  deg   Max Peak Torque 460    Min Peak Torque 205    Flex/Ext Ratio 2.2:1   % above normative data 14%     Outcomes:  Limitation Score: 28%  Visit 5 Score:   Visit 10 Score:   Discharge Score:   Goal: 20%    Range of Motion - MOVEMENT ASSESSMENT    ROM 2023 ROM 05/15/2023   Flexion minimal loss Minimal loss  tightness   Extension within functional limits WFL   Side bending Right within functional limits Minimal loss   tightness   Side bending Left within functional limits Minimal loss  "  tightness   Rotation Right within functional limits WFL   Tightness and pulling on the left   Rotation Left within functional limits WFL    Protraction within functional limits WFL  tightness   Retraction  within functional limits WFL  Tightness         REPEATED TEST MOVEMENTS:   Repeated Protraction in Sitting no worse  no better   Repeated Flexion in Sitting no worse  no better   Repeated Retraction in Sitting  no worse  no better   Repeated Retraction Extension in Sitting worse   Repeated Flexion in lying worse   Repeated Retraction in lying no worse  no better   Repeated Retraction Extension in lying worse     Treatment     Salvatore received the treatments listed below:      Salvatore participated in therapeutic exercises to develop strength, endurance, ROM, flexibility, posture, and core stabilization for 45 minutes including:    +Half fr:Pec positional release 2 mins, horizontal abd w/GTB x20, SA punches 4#  Upper trap stretch 2x30"  Levator stretch 2x30"  Cervical retraction with RTB 2x10,3"supine   Cervical retraction with extension 2x10  Cervical extension supine head off table x1min  Seated thoracic extension 10x5"  +Scapular retractions 2x10x3"  +Cervical rotation with towel over pressure 8x5" ea   +No money RTB x10x3" GTB x10,3"  +Cat/cow x15,3"    [facility] 5/26/2023   Visit Number 5   VAS Pain Rating 3   Retraction in Sitting 20   Retraction with Extension -   Rotation -   Cervical Extension - Seat Pad -   Seat Adjustment -   Top Dead Center -   Counterweight -   Cervical Flexion -   Cervical Extension -   Cervical Peak Torque -   Cervical Weight 345   Repetitions 20   Rating of Perceived Exertion 5        Peripheral muscle strengthening which included one set of 15-20 repetitions at a slow and controlled 10-13 second per rep pace focused on strengthening supporting musculature in order to improve body mechanics and functional mobility. Patient and therapist focused on proper form during " treatment to ensure optimal strengthening of each targeted muscle group.  Machines utilized include torso rotation, leg extension, leg curl, chest press, upright row. Tricep extension, bicep curl, leg press, and hip abduction added visit 3    Pt given cold pack for 5 minutes to cervical and thoracic spine in z lying.    Patient Education and Home Exercises     Home exercises include:  Upper trap stretch   Levator stretch   Cervical retraction  Cardio program (V5): 5/26/23  Lifting education (V11):   [employer] Discharge handout (date given): -    Education provided:   - PT role and POC  - HEP    Written Home Exercises Provided: Patient instructed to cont prior HEP.  Exercises were reviewed and Salvatore was able to demonstrate them prior to the end of the session.  Salvatore demonstrated good  understanding of the education provided.     See EMR under Patient Instructions for exercises provided prior visit.  Assessment   Curtis arrives to PT reporting less pain overall. Progressed periscapular strengthening and stabilization exercises this visit with appropriate challenge. Discussed benefits of cardio program per 5th visit protocol. Patient able to perform 18 reps on c/s medx machine with an RPE of 5/10. Will continue to progress as able.      Patient is making good progress towards established goals.  Pt will continue to benefit from skilled outpatient physical therapy to address the deficits stated in the impairment chart, provide pt/family education and to maximize pt's level of independence in the home and community environment.     Anticipated Barriers for therapy: None     Pt's spiritual, cultural and educational needs considered and pt agreeable to plan of care and goals as stated below:     GOALS: Pt is in agreement with the following goals.     Short term goals: 6 weeks or 10 visits   1.  Pt will demonstratte maintained cervical ROM for ease with ADLs and driving  (approp and ongoing)  2. Pt will demonstrate  independence with reducing or controlling symptoms with ther ex, movement, or position independently, able to reduce pain 1-2 points on pain scale using strategies taught in therapy  (approp and ongoing)  3. Pt will demonstrate increased MedX average isometric strength value by 5% with  when compared to the initial testing resulting in improved ability to perform bending, lifting, and carrying activities safely, confidently.  (approp and ongoing)    Long term goals: 10 weeks or 20 visits  1. Pt will demonstratte increased cervical ROM as measured by med ex by 3 degrees from initial test which results in functional ROM of neck for ease with ADLs and driving  (approp and ongoing)  2. Pt will demonstrate increased MedX average isometric strength value  by 10% from initial test to improve ability to lift and carry, and sustain good posture while performing ADL's  (approp and ongoing)  3.Pt will demonstrate reduced pain and improved functional outcomes as reported on the FOTO by reaching a limitation score of < or = 20% or less in order to demonstrate subjective improvement in pt's condition.    (approp and ongoing)  4. Pt will demonstrate independence with reducing or controlling symptoms with ther ex, movement, or position independently, able to reduce pain 2-4 points on pain scale using strategies taught in therapy  (approp and ongoing)  5. Pt will demonstrate independence with the HEP at discharge.   (approp and ongoing)  6.  Pt will be able to play his guitar for at least 30 mins without pain.  (approp and ongoing)    Plan     Continue with established Plan of Care towards established PT goals.     Therapist: Chely Gutierrez, PTA  5/26/2023

## 2023-05-29 ENCOUNTER — CLINICAL SUPPORT (OUTPATIENT)
Dept: REHABILITATION | Facility: OTHER | Age: 33
End: 2023-05-29
Payer: COMMERCIAL

## 2023-05-29 DIAGNOSIS — R29.3 BAD POSTURE: Primary | ICD-10-CM

## 2023-05-29 PROCEDURE — 97750 PHYSICAL PERFORMANCE TEST: CPT | Mod: 32

## 2023-05-29 NOTE — PROGRESS NOTES
[facility]  Physical Therapy Treatment Note     Name: Curtis Moseley  Clinic Number: [id number]    Therapy Diagnosis:   Encounter Diagnosis   Name Primary?    Bad posture Yes         Physician: Elsie Paredes, *    Visit Date: 2023    Physician Orders: PT Eval and Treat   Medical Diagnosis from Referral:   M50.321 (ICD-10-CM) - Other cervical disc degeneration at C4-C5 level   M54.2 (ICD-10-CM) - Neck pain   Evaluation Date: 2023  Authorization Period Expiration: 2023  Reassessment Due: 2023  Plan of Care Certification Period: 2023  Visit #/Visits authorized:      PTA Visit #:      Time In: 9:42 am  Time Out: 10:32 am  Total Billable Time: 55 minutes  INSURANCE and OUTCOMES: Fee for Service with FOTO Outcomes 1/3    Precautions: standard    Pattern of pain determined: 1REN --> 1REP    Subjective     Curtis reports no pain this visit.   Patient reports tolerating previous visit well  Patient reports their pain to be 3/10 on a 0-10 scale with 0 being no pain and 10 being the worst pain imaginable.  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  Pt goals: relieve pain; fix posture    Objective      Baseline Isometric Testing on Med X equipment:  Testing administered by PT  Date of testin2023  ROM  deg   Max Peak Torque 460    Min Peak Torque 205    Flex/Ext Ratio 2.2:1   % above normative data 14%     Outcomes:  Limitation Score: 28%  Visit 5 Score:   Visit 10 Score:   Discharge Score:   Goal: 20%    Range of Motion - MOVEMENT ASSESSMENT    ROM 2023 ROM 05/15/2023   Flexion minimal loss Minimal loss  tightness   Extension within functional limits WFL   Side bending Right within functional limits Minimal loss   tightness   Side bending Left within functional limits Minimal loss   tightness   Rotation Right within  "functional limits WFL   Tightness and pulling on the left   Rotation Left within functional limits WFL    Protraction within functional limits WFL  tightness   Retraction  within functional limits WFL  Tightness         REPEATED TEST MOVEMENTS:   Repeated Protraction in Sitting no worse  no better   Repeated Flexion in Sitting no worse  no better   Repeated Retraction in Sitting  no worse  no better   Repeated Retraction Extension in Sitting worse   Repeated Flexion in lying worse   Repeated Retraction in lying no worse  no better   Repeated Retraction Extension in lying worse     Treatment     Salvatore received the treatments listed below:      Salvatore participated in therapeutic exercises to develop strength, endurance, ROM, flexibility, posture, and core stabilization for 45 minutes including:    Half fr:Pec positional release 2 mins, horizontal abd w/GTB x20, SA punches 4#  Upper trap stretch 2x30"  Levator stretch 2x30"  Cervical retraction with RTB 2x10,3"supine   Cervical retraction with extension 2x10  Cervical extension supine head off table x1min  Seated thoracic extension 10x5"  Scapular retractions 2x10x3"  Cervical rotation with towel over pressure 8x5" ea   No money RTB x10x3" GTB x10,3"  Cat/cow x15,3"    [facility] 5/29/2023   Visit Number 6   VAS Pain Rating 0   Retraction in Sitting -   Retraction with Extension -   Rotation -   Cervical Extension - Seat Pad -   Seat Adjustment -   Top Dead Center -   Counterweight -   Cervical Flexion -   Cervical Extension -   Cervical Peak Torque -   Cervical Weight 345   Repetitions 20   Rating of Perceived Exertion 6          Peripheral muscle strengthening which included one set of 15-20 repetitions at a slow and controlled 10-13 second per rep pace focused on strengthening supporting musculature in order to improve body mechanics and functional mobility. Patient and therapist focused on proper form during treatment to ensure optimal strengthening " of each targeted muscle group.  Machines utilized include torso rotation, leg extension, leg curl, chest press, upright row. Tricep extension, bicep curl, leg press, and hip abduction added visit 3    Pt given cold pack for 5 minutes to cervical and thoracic spine in z lying.    Patient Education and Home Exercises     Home exercises include:  Upper trap stretch   Levator stretch   Cervical retraction  Cardio program (V5): 5/26/23  Lifting education (V11):   [employer] Discharge handout (date given): -    Education provided:   - PT role and POC  - HEP    Written Home Exercises Provided: Patient instructed to cont prior HEP.  Exercises were reviewed and Salvatore was able to demonstrate them prior to the end of the session.  Salvatore demonstrated good  understanding of the education provided.     See EMR under Patient Instructions for exercises provided prior visit.  Assessment   Curtis arrives to  with no complaints of pain or increased symptoms. Continued with previous exercises and progressions that were added previous session. Good response to today interventions. Minimal cues provided to correct posture. Cervical med x is continued with previous weight d/t high RPE rating from last visit. 345 ft/lbs completing 20 reps with RPE of 6/10. Patient uses hands at 15 rep to help assist with remaining reps. Patient admits to using hands to assist with machine around 15 rep. Consider maintaining cervical weight until postural form has improved on med x machine.    Patient is making good progress towards established goals.  Pt will continue to benefit from skilled outpatient physical therapy to address the deficits stated in the impairment chart, provide pt/family education and to maximize pt's level of independence in the home and community environment.     Anticipated Barriers for therapy: None     Pt's spiritual, cultural and educational needs considered and pt agreeable to plan of care and goals as stated below:      GOALS: Pt is in agreement with the following goals.     Short term goals: 6 weeks or 10 visits   1.  Pt will demonstratte maintained cervical ROM for ease with ADLs and driving  (approp and ongoing)  2. Pt will demonstrate independence with reducing or controlling symptoms with ther ex, movement, or position independently, able to reduce pain 1-2 points on pain scale using strategies taught in therapy  (approp and ongoing)  3. Pt will demonstrate increased MedX average isometric strength value by 5% with  when compared to the initial testing resulting in improved ability to perform bending, lifting, and carrying activities safely, confidently.  (approp and ongoing)    Long term goals: 10 weeks or 20 visits  1. Pt will demonstratte increased cervical ROM as measured by med ex by 3 degrees from initial test which results in functional ROM of neck for ease with ADLs and driving  (approp and ongoing)  2. Pt will demonstrate increased MedX average isometric strength value  by 10% from initial test to improve ability to lift and carry, and sustain good posture while performing ADL's  (approp and ongoing)  3.Pt will demonstrate reduced pain and improved functional outcomes as reported on the FOTO by reaching a limitation score of < or = 20% or less in order to demonstrate subjective improvement in pt's condition.    (approp and ongoing)  4. Pt will demonstrate independence with reducing or controlling symptoms with ther ex, movement, or position independently, able to reduce pain 2-4 points on pain scale using strategies taught in therapy  (approp and ongoing)  5. Pt will demonstrate independence with the HEP at discharge.   (approp and ongoing)  6.  Pt will be able to play his guitar for at least 30 mins without pain.  (approp and ongoing)    Plan     Continue with established Plan of Care towards established PT goals.     Therapist: Tony Fischer, PTA  5/29/2023

## 2023-06-05 ENCOUNTER — CLINICAL SUPPORT (OUTPATIENT)
Dept: REHABILITATION | Facility: OTHER | Age: 33
End: 2023-06-05
Payer: COMMERCIAL

## 2023-06-05 DIAGNOSIS — R29.3 BAD POSTURE: Primary | ICD-10-CM

## 2023-06-05 PROCEDURE — 97110 THERAPEUTIC EXERCISES: CPT

## 2023-06-05 NOTE — PROGRESS NOTES
[facility]  Physical Therapy Treatment Note     Name: Curtis Moseley  Clinic Number: [id number]    Therapy Diagnosis:   Encounter Diagnosis   Name Primary?    Bad posture Yes       Physician: Elsie Paredes, *    Visit Date: 2023    Physician Orders: PT Eval and Treat   Medical Diagnosis from Referral:   M50.321 (ICD-10-CM) - Other cervical disc degeneration at C4-C5 level   M54.2 (ICD-10-CM) - Neck pain   Evaluation Date: 2023  Authorization Period Expiration: 2023  Reassessment Due: 2023  Plan of Care Certification Period: 2023  Visit #/Visits authorized:      PTA Visit #: 0/5     Time In: 9:02 am  Time Out: 10:01 am  Total Billable Time: 54 minutes  INSURANCE and OUTCOMES: Fee for Service with FOTO Outcomes 1/3    Precautions: standard    Pattern of pain determined: 1REN --> 1REP    Subjective     Curtis reports his neck is feeling good, he was able to do a lot of yard work and household chores this weekend without pain    Patient reports tolerating previous visit well  Patient reports their pain to be 0/10 on a 0-10 scale with 0 being no pain and 10 being the worst pain imaginable.  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  Pt goals: relieve pain; fix posture    Objective      Baseline Isometric Testing on Med X equipment:  Testing administered by PT  Date of testin2023  ROM  deg   Max Peak Torque 460    Min Peak Torque 205    Flex/Ext Ratio 2.2:1   % above normative data 14%     Outcomes:  Limitation Score: 28%  Visit 5 Score:   Visit 10 Score:   Discharge Score:   Goal: 20%    Range of Motion - MOVEMENT ASSESSMENT    ROM 2023 ROM 05/15/2023   Flexion minimal loss Minimal loss  tightness   Extension within functional limits WFL   Side bending Right within functional limits Minimal loss   tightness   Side bending  "Left within functional limits Minimal loss   tightness   Rotation Right within functional limits WFL   Tightness and pulling on the left   Rotation Left within functional limits WFL    Protraction within functional limits WFL  tightness   Retraction  within functional limits WFL  Tightness         REPEATED TEST MOVEMENTS:   Repeated Protraction in Sitting no worse  no better   Repeated Flexion in Sitting no worse  no better   Repeated Retraction in Sitting  no worse  no better   Repeated Retraction Extension in Sitting worse   Repeated Flexion in lying worse   Repeated Retraction in lying no worse  no better   Repeated Retraction Extension in lying worse     Treatment     Salvatore received the treatments listed below:      Salvatore participated in therapeutic exercises to develop strength, endurance, ROM, flexibility, posture, and core stabilization for 54 minutes including:    Cervical retraction x10,3, with RTB x10,3" seated   Cervical retraction with extension x10, w/ wiggle x10  Seated thoracic extension 10x5"  +wall angels x10  No money GTB 15x3" - against wall  +open books x10 ea  Cat/cow x15,3"  [facility] 6/5/2023   Visit Number 7   VAS Pain Rating 0   Retraction in Sitting 20   Retraction with Extension 20   Rotation -   Cervical Extension - Seat Pad -   Seat Adjustment -   Top Dead Center -   Counterweight -   Cervical Flexion 102   Cervical Extension 18   Cervical Peak Torque -   Cervical Weight 360   Repetitions 15   Rating of Perceived Exertion 5         NP:  Cervical rotation with towel over pressure 8x5" ea   Upper trap stretch 2x30"  Levator stretch 2x30"  Half fr:Pec positional release 2 mins, horizontal abd w/GTB x20, SA punches 4#  Scapular retractions 2x10x3"  Cervical extension supine head off table x1min       Peripheral muscle strengthening which included one set of 15-20 repetitions at a slow and controlled 10-13 second per rep pace focused on strengthening supporting musculature in " order to improve body mechanics and functional mobility. Patient and therapist focused on proper form during treatment to ensure optimal strengthening of each targeted muscle group.  Machines utilized include torso rotation, leg extension, leg curl, chest press, upright row. Tricep extension, bicep curl, leg press, and hip abduction added visit 3    Pt given cold pack for 5 minutes to cervical and thoracic spine in z lying.    Patient Education and Home Exercises     Home exercises include:  Upper trap stretch   Levator stretch   Cervical retraction  Cardio program (V5): 5/26/23  Lifting education (V11):   Fridge Magnet Discharge handout (date given): -    Education provided:   - PT role and POC  - HEP    Written Home Exercises Provided: Patient instructed to cont prior HEP.  Exercises were reviewed and Salvatore was able to demonstrate them prior to the end of the session.  Salvatore demonstrated good  understanding of the education provided.     See EMR under Patient Instructions for exercises provided prior visit.  Assessment   Curtis presents today without complaints of pain, and improved activity tolerance in the yard and around the house over the weekend. Progressed dynamic cervicothoracic and periscapular strengthening and mobility exercises with good tolerance and no adverse effects. Medx ROM increased to  deg and resistance progressed to 360 in/lbs and pt was able to complete 15 reps with 5/10 RPE. Continue to progress per HB protocol.     Patient is making good progress towards established goals.  Pt will continue to benefit from skilled outpatient physical therapy to address the deficits stated in the impairment chart, provide pt/family education and to maximize pt's level of independence in the home and community environment.     Anticipated Barriers for therapy: None     Pt's spiritual, cultural and educational needs considered and pt agreeable to plan of care and goals as stated below:     GOALS: Pt is  in agreement with the following goals.     Short term goals: 6 weeks or 10 visits   1.  Pt will demonstratte maintained cervical ROM for ease with ADLs and driving  (approp and ongoing)  2. Pt will demonstrate independence with reducing or controlling symptoms with ther ex, movement, or position independently, able to reduce pain 1-2 points on pain scale using strategies taught in therapy  (approp and ongoing)  3. Pt will demonstrate increased MedX average isometric strength value by 5% with  when compared to the initial testing resulting in improved ability to perform bending, lifting, and carrying activities safely, confidently.  (approp and ongoing)    Long term goals: 10 weeks or 20 visits  1. Pt will demonstratte increased cervical ROM as measured by med ex by 3 degrees from initial test which results in functional ROM of neck for ease with ADLs and driving  (approp and ongoing)  2. Pt will demonstrate increased MedX average isometric strength value  by 10% from initial test to improve ability to lift and carry, and sustain good posture while performing ADL's  (approp and ongoing)  3.Pt will demonstrate reduced pain and improved functional outcomes as reported on the FOTO by reaching a limitation score of < or = 20% or less in order to demonstrate subjective improvement in pt's condition.    (approp and ongoing)  4. Pt will demonstrate independence with reducing or controlling symptoms with ther ex, movement, or position independently, able to reduce pain 2-4 points on pain scale using strategies taught in therapy  (approp and ongoing)  5. Pt will demonstrate independence with the HEP at discharge.   (approp and ongoing)  6.  Pt will be able to play his guitar for at least 30 mins without pain.  (approp and ongoing)    Plan     Continue with established Plan of Care towards established PT goals.     Therapist: Shant Vanessa, PT  6/5/2023

## 2023-06-08 ENCOUNTER — CLINICAL SUPPORT (OUTPATIENT)
Dept: REHABILITATION | Facility: OTHER | Age: 33
End: 2023-06-08
Payer: COMMERCIAL

## 2023-06-08 DIAGNOSIS — R29.3 BAD POSTURE: Primary | ICD-10-CM

## 2023-06-08 PROCEDURE — 97110 THERAPEUTIC EXERCISES: CPT

## 2023-06-08 NOTE — PROGRESS NOTES
[facility]  Physical Therapy Treatment Note     Name: Curtis Moseley  Clinic Number: [id number]    Therapy Diagnosis:   Encounter Diagnosis   Name Primary?    Bad posture Yes       Physician: Eslie Paredes, *    Visit Date: 2023    Physician Orders: PT Eval and Treat   Medical Diagnosis from Referral:   M50.321 (ICD-10-CM) - Other cervical disc degeneration at C4-C5 level   M54.2 (ICD-10-CM) - Neck pain   Evaluation Date: 2023  Authorization Period Expiration: 2023  Reassessment Due: 2023  Plan of Care Certification Period: 2023  Visit #/Visits authorized:      PTA Visit #: 0/5     Time In: 1027  Time Out: 1130  Total Billable Time: 58 minutes  INSURANCE and OUTCOMES: Fee for Service with FOTO Outcomes 1/3    Precautions: standard    Pattern of pain determined: 1REN --> 1REP    Subjective     Curtis reports neck has been feeling good. Doing exercises at home with good effect.     Patient reports tolerating previous visit well  Patient reports their pain to be 0/10 on a 0-10 scale with 0 being no pain and 10 being the worst pain imaginable.  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  Pt goals: relieve pain; fix posture    Objective      Baseline Isometric Testing on Med X equipment:  Testing administered by PT  Date of testin2023  ROM  deg   Max Peak Torque 460    Min Peak Torque 205    Flex/Ext Ratio 2.2:1   % above normative data 14%     Outcomes:  Limitation Score: 28%  Visit 5 Score:   Visit 10 Score:   Discharge Score:   Goal: 20%    Range of Motion - MOVEMENT ASSESSMENT    ROM 2023 ROM 05/15/2023   Flexion minimal loss Minimal loss  tightness   Extension within functional limits WFL   Side bending Right within functional limits Minimal loss   tightness   Side bending Left within functional limits Minimal loss  "  tightness   Rotation Right within functional limits WFL   Tightness and pulling on the left   Rotation Left within functional limits WFL    Protraction within functional limits WFL  tightness   Retraction  within functional limits WFL  Tightness         REPEATED TEST MOVEMENTS:   Repeated Protraction in Sitting no worse  no better   Repeated Flexion in Sitting no worse  no better   Repeated Retraction in Sitting  no worse  no better   Repeated Retraction Extension in Sitting worse   Repeated Flexion in lying worse   Repeated Retraction in lying no worse  no better   Repeated Retraction Extension in lying worse     Treatment     Salvatore received the treatments listed below:      Salvatore participated in therapeutic exercises to develop strength, endurance, ROM, flexibility, posture, and core stabilization for 55 minutes including:    Cervical retraction x10,3, with RTB x10,3" seated   Cervical retraction with extension x10, w/ wiggle x10  Seated thoracic extension 10x5"  wall angels x10  +Wall clock RTB x10  No money GTB 2x15x3" - against wall  open books x10 ea  Cat/cow x15,3"    [facility] 6/5/2023   Visit Number 7   VAS Pain Rating 0   Retraction in Sitting 20   Retraction with Extension 20   Rotation -   Cervical Extension - Seat Pad -   Seat Adjustment -   Top Dead Center -   Counterweight -   Cervical Flexion 102   Cervical Extension 18   Cervical Peak Torque -   Cervical Weight 360   Repetitions 15   Rating of Perceived Exertion 5     NP:  Cervical rotation with towel over pressure 8x5" ea   Upper trap stretch 2x30"  Levator stretch 2x30"  Half fr:Pec positional release 2 mins, horizontal abd w/GTB x20, SA punches 4#  Scapular retractions 2x10x3"  Cervical extension supine head off table x1min     Peripheral muscle strengthening which included one set of 15-20 repetitions at a slow and controlled 10-13 second per rep pace focused on strengthening supporting musculature in order to improve body " mechanics and functional mobility. Patient and therapist focused on proper form during treatment to ensure optimal strengthening of each targeted muscle group.  Machines utilized include torso rotation, leg extension, leg curl, chest press, upright row. Tricep extension, bicep curl, leg press, and hip abduction added visit 3    Pt given cold pack for 5 minutes to cervical and thoracic spine in z lying.    Patient Education and Home Exercises     Home exercises include:  Upper trap stretch   Levator stretch   Cervical retraction  Cardio program (V5): 5/26/23  Lifting education (V11):   Fridge Magnet Discharge handout (date given): -    Education provided:   - PT role and POC  - HEP    Written Home Exercises Provided: Patient instructed to cont prior HEP.  Exercises were reviewed and Salvatore was able to demonstrate them prior to the end of the session.  Salvatore demonstrated good  understanding of the education provided.     See EMR under Patient Instructions for exercises provided prior visit.  Assessment   Curtis presents without pain this session. Continued with cervical and thoracic mobility and stability exercises this session. Adding in standing wall clocks with theraband for resistance. On MedX machine maintained resistance at 360 ft-lbs for 15 repetitions with RPE of 5/10. Next session decrease resistance and emphasize endurance.     Patient is making good progress towards established goals.  Pt will continue to benefit from skilled outpatient physical therapy to address the deficits stated in the impairment chart, provide pt/family education and to maximize pt's level of independence in the home and community environment.     Anticipated Barriers for therapy: None     Pt's spiritual, cultural and educational needs considered and pt agreeable to plan of care and goals as stated below:     GOALS: Pt is in agreement with the following goals.     Short term goals: 6 weeks or 10 visits   1.  Pt will demonstratte  maintained cervical ROM for ease with ADLs and driving  (approp and ongoing)  2. Pt will demonstrate independence with reducing or controlling symptoms with ther ex, movement, or position independently, able to reduce pain 1-2 points on pain scale using strategies taught in therapy  (approp and ongoing)  3. Pt will demonstrate increased MedX average isometric strength value by 5% with  when compared to the initial testing resulting in improved ability to perform bending, lifting, and carrying activities safely, confidently.  (approp and ongoing)    Long term goals: 10 weeks or 20 visits  1. Pt will demonstratte increased cervical ROM as measured by med ex by 3 degrees from initial test which results in functional ROM of neck for ease with ADLs and driving  (approp and ongoing)  2. Pt will demonstrate increased MedX average isometric strength value  by 10% from initial test to improve ability to lift and carry, and sustain good posture while performing ADL's  (approp and ongoing)  3.Pt will demonstrate reduced pain and improved functional outcomes as reported on the FOTO by reaching a limitation score of < or = 20% or less in order to demonstrate subjective improvement in pt's condition.    (approp and ongoing)  4. Pt will demonstrate independence with reducing or controlling symptoms with ther ex, movement, or position independently, able to reduce pain 2-4 points on pain scale using strategies taught in therapy  (approp and ongoing)  5. Pt will demonstrate independence with the HEP at discharge.   (approp and ongoing)  6.  Pt will be able to play his guitar for at least 30 mins without pain.  (approp and ongoing)    Plan     Continue with established Plan of Care towards established PT goals.     Therapist: Theresa Gan, PT  6/8/2023

## 2023-06-29 ENCOUNTER — CLINICAL SUPPORT (OUTPATIENT)
Dept: REHABILITATION | Facility: OTHER | Age: 33
End: 2023-06-29
Payer: COMMERCIAL

## 2023-06-29 DIAGNOSIS — R29.3 BAD POSTURE: Primary | ICD-10-CM

## 2023-06-29 PROCEDURE — 97750 PHYSICAL PERFORMANCE TEST: CPT | Mod: 32

## 2023-06-29 RX ORDER — PHENTERMINE HYDROCHLORIDE 37.5 MG/1
TABLET ORAL
Qty: 30 TABLET | Refills: 1 | Status: SHIPPED | OUTPATIENT
Start: 2023-06-29 | End: 2023-08-23 | Stop reason: SDUPTHER

## 2023-06-29 NOTE — PROGRESS NOTES
[facility]  Physical Therapy Treatment Note     Name: Curtis Moseley  Clinic Number: [id number]    Therapy Diagnosis:   Encounter Diagnosis   Name Primary?    Bad posture Yes       Physician: Elsie Paredes, *    Visit Date: 2023    Physician Orders: PT Eval and Treat   Medical Diagnosis from Referral:   M50.321 (ICD-10-CM) - Other cervical disc degeneration at C4-C5 level   M54.2 (ICD-10-CM) - Neck pain   Evaluation Date: 2023  Authorization Period Expiration: 2023  Reassessment Due: 2023  Plan of Care Certification Period: 2023  Visit #/Visits authorized:  *Decrease resistance*    PTA Visit #: 0/5     Time In: 13:30  Time Out: 14:27  Total Billable Time: 57 minutes  INSURANCE and OUTCOMES: Fee for Service with FOTO Outcomes 1/3    Precautions: standard    Pattern of pain determined: 1REN --> 1REP    Subjective     Curtis reports neck has been feeling good and denies discomfort at this time. He gets the most discomfort with playing guitar and it will linger after     Patient reports tolerating previous visit well  Patient reports their pain to be 0/10 on a 0-10 scale with 0 being no pain and 10 being the worst pain imaginable.  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  Pt goals: relieve pain; fix posture    Objective      Baseline Isometric Testing on Med X equipment:  Testing administered by PT  Date of testin2023  ROM  deg   Max Peak Torque 460    Min Peak Torque 205    Flex/Ext Ratio 2.2:1   % above normative data 14%     Outcomes:  Limitation Score: 28%  Visit 5 Score:   Visit 10 Score:   Discharge Score:   Goal: 20%    Range of Motion - MOVEMENT ASSESSMENT    ROM 2023 ROM 05/15/2023   Flexion minimal loss Minimal loss  tightness   Extension within functional limits WFL   Side bending Right within functional  "limits Minimal loss   tightness   Side bending Left within functional limits Minimal loss   tightness   Rotation Right within functional limits WFL   Tightness and pulling on the left   Rotation Left within functional limits WFL    Protraction within functional limits WFL  tightness   Retraction  within functional limits WFL  Tightness         REPEATED TEST MOVEMENTS:   Repeated Protraction in Sitting no worse  no better   Repeated Flexion in Sitting no worse  no better   Repeated Retraction in Sitting  no worse  no better   Repeated Retraction Extension in Sitting worse   Repeated Flexion in lying worse   Repeated Retraction in lying no worse  no better   Repeated Retraction Extension in lying worse     Treatment     Salvatore received the treatments listed below:      Salvatore participated in therapeutic exercises to develop strength, endurance, ROM, flexibility, posture, and core stabilization for 55 minutes including:    Cervical retraction x10,3, with GTB x10,3" seated   Cervical retraction with extension x10, w/ wiggle x10  Seated thoracic extension 10x5"  Wall clock RTB CW/CCW x2  No money GTB 2x15x3" - against wall  open books x10 ea  Cat/cow x15,3"  +I, T, Y with head in cervical pillow 2x10    [facility] 6/29/2023   Visit Number 9   VAS Pain Rating 0   Treadmill Time (in min.) 5   Retraction in Sitting 20   Retraction with Extension 20   Rotation -   Cervical Extension Seat Pad -   Cervical Peak Torque -   Cervical Weight 360   Repetitions 15   Rating of Perceived Exertion 7   Ice - Z Lie (in min.) 5            NP:  Cervical rotation with towel over pressure 8x5" ea   Upper trap stretch 2x30"  Levator stretch 2x30"  Half fr:Pec positional release 2 mins, horizontal abd w/GTB x20, SA punches 4#  Scapular retractions 2x10x3"  Cervical extension supine head off table x1 min  wall angels x10     Peripheral muscle strengthening which included one set of 15-20 repetitions at a slow and controlled 10-13 second " per rep pace focused on strengthening supporting musculature in order to improve body mechanics and functional mobility. Patient and therapist focused on proper form during treatment to ensure optimal strengthening of each targeted muscle group.  Machines utilized include torso rotation, leg extension, leg curl, chest press, upright row. Tricep extension, bicep curl, leg press, and hip abduction added visit 3    Pt given cold pack for 5 minutes to cervical and thoracic spine in z lying.    Patient Education and Home Exercises     Home exercises include:  Upper trap stretch   Levator stretch   Cervical retraction  Cardio program (V5): 5/26/23  Lifting education (V11):   Fridge Magnet Discharge handout (date given): -    Education provided:   - PT role and POC  - HEP    Written Home Exercises Provided: Patient instructed to cont prior HEP.  Exercises were reviewed and Salvatore was able to demonstrate them prior to the end of the session.  Salvatore demonstrated good  understanding of the education provided.     See EMR under Patient Instructions for exercises provided prior visit.  Assessment   Curtis presents without pain this session but continues to have upper back/thoracic region pain after playing guitar. Continued with cervical and thoracic mobility and stability exercises this session. Adding in prone thoracic muscular strengthening with I, T, Y, he was able to complete with moderate cues and reports of adequate challenge. On MedX machine maintained resistance at 360 ft-lbs for 15 repetitions with RPE of 7/10. This the second visit he has not been able to progress past 15 repetitions with increasing difficulty, plan to decrease resistance next session and emphasize endurance.     Patient is making good progress towards established goals.  Pt will continue to benefit from skilled outpatient physical therapy to address the deficits stated in the impairment chart, provide pt/family education and to maximize pt's level of  independence in the home and community environment.     Anticipated Barriers for therapy: None     Pt's spiritual, cultural and educational needs considered and pt agreeable to plan of care and goals as stated below:     GOALS: Pt is in agreement with the following goals.     Short term goals: 6 weeks or 10 visits   1.  Pt will demonstratte maintained cervical ROM for ease with ADLs and driving  (approp and ongoing)  2. Pt will demonstrate independence with reducing or controlling symptoms with ther ex, movement, or position independently, able to reduce pain 1-2 points on pain scale using strategies taught in therapy  (approp and ongoing)  3. Pt will demonstrate increased MedX average isometric strength value by 5% with  when compared to the initial testing resulting in improved ability to perform bending, lifting, and carrying activities safely, confidently.  (approp and ongoing)    Long term goals: 10 weeks or 20 visits  1. Pt will demonstratte increased cervical ROM as measured by med ex by 3 degrees from initial test which results in functional ROM of neck for ease with ADLs and driving  (approp and ongoing)  2. Pt will demonstrate increased MedX average isometric strength value  by 10% from initial test to improve ability to lift and carry, and sustain good posture while performing ADL's  (approp and ongoing)  3.Pt will demonstrate reduced pain and improved functional outcomes as reported on the FOTO by reaching a limitation score of < or = 20% or less in order to demonstrate subjective improvement in pt's condition.    (approp and ongoing)  4. Pt will demonstrate independence with reducing or controlling symptoms with ther ex, movement, or position independently, able to reduce pain 2-4 points on pain scale using strategies taught in therapy  (approp and ongoing)  5. Pt will demonstrate independence with the HEP at discharge.   (approp and ongoing)  6.  Pt will be able to play his guitar for at least 30 mins  without pain.  (approp and ongoing)    Plan     Continue with established Plan of Care towards established PT goals.     Therapist: Kala Virk, PT  6/29/2023

## 2023-07-03 ENCOUNTER — CLINICAL SUPPORT (OUTPATIENT)
Dept: REHABILITATION | Facility: OTHER | Age: 33
End: 2023-07-03
Payer: COMMERCIAL

## 2023-07-03 DIAGNOSIS — R29.3 BAD POSTURE: Primary | ICD-10-CM

## 2023-07-03 PROCEDURE — 97110 THERAPEUTIC EXERCISES: CPT

## 2023-07-03 NOTE — PROGRESS NOTES
[facility]  Physical Therapy Treatment Note     Name: Curtis Moseley  Clinic Number: [id number]    Therapy Diagnosis:   Encounter Diagnosis   Name Primary?    Bad posture Yes     Physician: Elsie Paredes, *    Visit Date: 7/3/2023    Physician Orders: PT Eval and Treat   Medical Diagnosis from Referral:   M50.321 (ICD-10-CM) - Other cervical disc degeneration at C4-C5 level   M54.2 (ICD-10-CM) - Neck pain   Evaluation Date: 2023  Authorization Period Expiration: 2023  Reassessment Due: 2023  Plan of Care Certification Period: 2023  Visit #/Visits authorized: 10/20 Switching to lumbar on visit 11 need PT to test   HAVE HIM DO FOTO     PTA Visit #: 0/5     Time In: 905  Time Out: 1005  Total Billable Time: 55 minutes  INSURANCE and OUTCOMES: Fee for Service with FOTO Outcomes 1/3    Precautions: standard    Pattern of pain determined: 1REN --> 1REP    Subjective     Curtis reports feeling pretty good this morning, no symptoms at this time.     Patient reports tolerating previous visit well  Patient reports their pain to be 0/10 on a 0-10 scale with 0 being no pain and 10 being the worst pain imaginable.  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  Pt goals: relieve pain; fix posture    Objective      Baseline Isometric Testing on Med X equipment:  Testing administered by PT  Date of testin2023  ROM  deg   Max Peak Torque 460    Min Peak Torque 205    Flex/Ext Ratio 2.2:1   % above normative data 14%     Midpoint Isometric Testing on Med X equipment:  Testing administered by PT  Date of testin2023  ROM  deg   Max Peak Torque 579   Min Peak Torque 248   Flex/Ext Ratio 2.33:1   % above normative data 25   15% increase from initial evaluation    Outcomes:  Limitation Score: 28%  Visit 10 Score: 4%  Discharge Score:   Goal:  "20%    Range of Motion - MOVEMENT ASSESSMENT    ROM 04/19/2023 ROM 05/15/2023   Flexion minimal loss Minimal loss  tightness   Extension within functional limits WFL   Side bending Right within functional limits Minimal loss   tightness   Side bending Left within functional limits Minimal loss   tightness   Rotation Right within functional limits WFL   Tightness and pulling on the left   Rotation Left within functional limits WFL    Protraction within functional limits WFL  tightness   Retraction  within functional limits WFL  Tightness         REPEATED TEST MOVEMENTS:   Repeated Protraction in Sitting no worse  no better   Repeated Flexion in Sitting no worse  no better   Repeated Retraction in Sitting  no worse  no better   Repeated Retraction Extension in Sitting worse   Repeated Flexion in lying worse   Repeated Retraction in lying no worse  no better   Repeated Retraction Extension in lying worse     Treatment     Salvatore received the treatments listed below:      Salvatore participated in therapeutic exercises to develop strength, endurance, ROM, flexibility, posture, and core stabilization for 55 minutes including:    Cervical retraction x10,3, with GTB x10,3" seated   Cervical retraction with extension w/ wiggle 2 x 10  Seated thoracic extension 10x5"  Wall clock GTB CW/CCW x2  No money GTB 2x15x3" - against wall  open books x10 ea  Cat/cow x15,3"  +I, T, Y on PB (floor) x10 2# DB  +Seated OH Press x10 15#  +Seated OH bilateral ER x10 15#    [facility] 7/3/2023   Visit Number 10   VAS Pain Rating 0   Treadmill Time (in min.) 5   Retraction in Sitting 20   Retraction with Extension 20   Rotation -   Cervical Extension Seat Pad -   Seat Adjustment -   Top Dead Center -   Counterweight -   Cervical Flexion 102   Cervical Extension 30   Cervical Peak Torque 579   Cervical Weight -   Repetitions -   Rating of Perceived Exertion -   Ice - Z Lie (in min.) 5     NP:  Cervical rotation with towel over pressure 8x5" " "ea   Upper trap stretch 2x30"  Levator stretch 2x30"  Half fr:Pec positional release 2 mins, horizontal abd w/GTB x20, SA punches 4#  Scapular retractions 2x10x3"  Cervical extension supine head off table x1 min  wall angels x10     Peripheral muscle strengthening which included one set of 15-20 repetitions at a slow and controlled 10-13 second per rep pace focused on strengthening supporting musculature in order to improve body mechanics and functional mobility. Patient and therapist focused on proper form during treatment to ensure optimal strengthening of each targeted muscle group.  Machines utilized include torso rotation, leg extension, leg curl, chest press, upright row. Tricep extension, bicep curl, leg press, and hip abduction added visit 3    Pt given cold pack for 5 minutes to cervical and thoracic spine in z lying.    Patient Education and Home Exercises     Home exercises include:  Upper trap stretch   Levator stretch   Cervical retraction  Cardio program (V5): 5/26/23  Lifting education (V11):   Fridge Magnet Discharge handout (date given): -    Education provided:   - PT role and POC  - HEP    Written Home Exercises Provided: Patient instructed to cont prior HEP.  Exercises were reviewed and Salvatore was able to demonstrate them prior to the end of the session.  Salvatore demonstrated good  understanding of the education provided.     See EMR under Patient Instructions for exercises provided prior visit.  Assessment   Salvatore has attended 10 visits at [facility] which included MD evaluation, PT evaluation with isometric testing, and physical therapy treatment including HEP instruction, education, aerobic activity, dynamic strengthening on MedX equipment for the spine, and whole body strengthening on MedX equipment with increasing resistance. Patient demonstrates increased ability to reduce symptoms, improve posture, improve ROM, and improve strength, as stated below:    -Maintained cervical ROM, " currently , kendra to get through full range of motion with minimal increase in symptoms.  -Improved strength at each test point on lumbar MedX isometric test with 15% average improvement noted with reduced pain noted by patient.  -Initial outcome tool score 28% and current outcome tool score 4% indicating reduced pain and improved function.    Plan is to transition to lumbar medX machine and emphasis upper/mid back strengthening during the next sessions as most symptom presentation now is fatigue and decreased stability in the core.     Patient is making good progress towards established goals.  Pt will continue to benefit from skilled outpatient physical therapy to address the deficits stated in the impairment chart, provide pt/family education and to maximize pt's level of independence in the home and community environment.     Anticipated Barriers for therapy: None     Pt's spiritual, cultural and educational needs considered and pt agreeable to plan of care and goals as stated below:     GOALS: Pt is in agreement with the following goals.     Short term goals: 6 weeks or 10 visits   1.  Pt will demonstratte maintained cervical ROM for ease with ADLs and driving  (MET)  2. Pt will demonstrate independence with reducing or controlling symptoms with ther ex, movement, or position independently, able to reduce pain 1-2 points on pain scale using strategies taught in therapy  (MET)  3. Pt will demonstrate increased MedX average isometric strength value by 5% with  when compared to the initial testing resulting in improved ability to perform bending, lifting, and carrying activities safely, confidently.  (MET)  4.  Pt will demonstrate increased lumbar MedX average isometric strength value by 10% with  when compared to the midpoint testing resulting in improved ability to perform bending, lifting, and carrying activities safely, confidently.  (Approp and ongoing)    Long term goals: 10 weeks or 20 visits    1. Pt  will demonstrate increased MedX average isometric strength value  by 10% from initial test to improve ability to lift and carry, and sustain good posture while performing ADL's  (MET)   - Pt will demonstrate increased lumbar MedX average isometric strength value by 15% with  when compared to the midpoint testing resulting in improved ability to perform bending, lifting, and carrying activities safely, confidently.  (Approp and ongoing)  2.Pt will demonstrate reduced pain and improved functional outcomes as reported on the FOTO by reaching a limitation score of < or = 20% or less in order to demonstrate subjective improvement in pt's condition.    (MET)  3. Pt will demonstrate independence with reducing or controlling symptoms with ther ex, movement, or position independently, able to reduce pain 2-4 points on pain scale using strategies taught in therapy  (approp and ongoing)  4. Pt will demonstrate independence with the HEP at discharge.   (approp and ongoing)  5.  Pt will be able to play his guitar for at least 30 mins without pain.  (approp and ongoing)    Plan     Continue with established Plan of Care towards established PT goals.     Therapist: Theresa Gan, PT  7/3/2023

## 2023-08-08 ENCOUNTER — CLINICAL SUPPORT (OUTPATIENT)
Dept: REHABILITATION | Facility: OTHER | Age: 33
End: 2023-08-08
Payer: COMMERCIAL

## 2023-08-08 DIAGNOSIS — R29.3 BAD POSTURE: Primary | ICD-10-CM

## 2023-08-08 PROCEDURE — 97750 PHYSICAL PERFORMANCE TEST: CPT | Mod: 32

## 2023-08-08 NOTE — PLAN OF CARE
Outpatient Therapy Updated Plan of Care     Visit Date: 2023    Name: Curtis Moseley  Clinic Number: [id number]    Therapy Diagnosis:   Encounter Diagnosis   Name Primary?    Bad posture Yes     Physician: Elsie Paredes, *    Physician Orders: PT Eval and Treat   Medical Diagnosis from Referral:   M50.321 (ICD-10-CM) - Other cervical disc degeneration at C4-C5 level   M54.2 (ICD-10-CM) - Neck pain   Evaluation Date: 2023  Authorization Period Expiration: 2023  Reassessment Due: 2023  Plan of Care Certification Period: Updated to 2023  Visit #/Visits authorized:  (cervical to lumbar transition)      Precautions: Standard    Subjective     Update: Curtis reports his neck has been feeling much better, he would like to finish out his visits doing the lumbar healthy back program. He was unable to come for a few weeks because he contracted COVID     Patient reports tolerating previous visit well  Patient reports their pain to be 0/10 on a 0-10 scale with 0 being no pain and 10 being the worst pain imaginable.  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  Pt goals: relieve pain; fix posture    Objective     Update: Baseline CERVICAL Isometric Testing on Med X equipment:  Testing administered by PT  Date of testin2023  ROM  deg   Max Peak Torque 460    Min Peak Torque 205    Flex/Ext Ratio 2.2:1   % above normative data 14%      Final CERVICAL Isometric Testing on Med X equipment:  Testing administered by PT  Date of testin2023  ROM  deg   Max Peak Torque 579   Min Peak Torque 248   Flex/Ext Ratio 2.33:1   % above normative data 25   15% increase from initial evaluation     Baseline LUMBAR Isometric Testing on Med X equipment:  Testing administered by PT  Date of testin2023  ROM 0-42 deg   Max Peak Torque 152   Min Peak Torque 50   Flex/Ext Ratio 3:1    % below normative data 55%      Starting weight 76 ft/lbs     Neck Disability Index Review 8/8/2023 7/3/2023   Pain Intensity 1 1   Personal Care (Washing, Dressing, etc.) 0 0   Lifting 0 0   Reading 0 0   Headaches 1 0   Concentration 0 0   Work 0 0   Driving 0 0   Sleeping 0 0   Recreation 0 1   Score: 2 2      Oswestry Questionnaire Review 8/8/2023 8/6/2023 4/19/2023   Pain Intensity 1 1 1   Personal Care (Washing, Dressing) 0 0 0   Lifting 0 0 0   Walking 0 0 0   Sitting 1 1 1   Standing 1 1 1   Sleeping 0 0 0   Social Life 0 0 0   Traveling 0 0 0   Employment/Homemaking 0 0 0   Score 3 3 3           Assessment     Update: Salvatore presents today without complaints of neck pain, but with some mid and low back pain. Pt requesting to focus on his low and mid pack pain for the remainder of his visits as his neck has been doing much better. Pt was tested on medx and was 55% below normative values for strength. Based on lumbar presentation pt was given updated HEP. Able to demo all with good form. Begin medx lumbar strengthening at 76 ft/lbs next visit. Continue to progress per HB protocol.      Patient is making good progress towards established goals.  Pt will continue to benefit from skilled outpatient physical therapy to address the deficits stated in the impairment chart, provide pt/family education and to maximize pt's level of independence in the home and community environment.      Anticipated Barriers for therapy: None      Pt's spiritual, cultural and educational needs considered and pt agreeable to plan of care and goals as stated below:      GOALS: Pt is in agreement with the following goals.     Short term goals: 6 weeks or 10 visits   1.  Pt will demonstratte maintained cervical ROM for ease with ADLs and driving  (MET)  2. Pt will demonstrate independence with reducing or controlling symptoms with ther ex, movement, or position independently, able to reduce pain 1-2 points on pain scale using strategies  taught in therapy  (MET)  3. Pt will demonstrate increased MedX average isometric strength value by 5% with  when compared to the initial testing resulting in improved ability to perform bending, lifting, and carrying activities safely, confidently.  (MET)  4.  Pt will demonstrate increased lumbar MedX average isometric strength value by 10% with  when compared to the midpoint testing resulting in improved ability to perform bending, lifting, and carrying activities safely, confidently.  (Approp and ongoing)     Long term goals: 10 weeks or 20 visits     1. Pt will demonstrate increased MedX average isometric strength value  by 10% from initial test to improve ability to lift and carry, and sustain good posture while performing ADL's  (MET)              - Pt will demonstrate increased lumbar MedX average isometric strength value by 15% with  when compared to the midpoint testing resulting in improved ability to perform bending, lifting, and carrying activities safely, confidently.  (Approp and ongoing)  2.Pt will demonstrate reduced pain and improved functional outcomes as reported on the FOTO by reaching a limitation score of < or = 20% or less in order to demonstrate subjective improvement in pt's condition.    (MET)  3. Pt will demonstrate independence with reducing or controlling symptoms with ther ex, movement, or position independently, able to reduce pain 2-4 points on pain scale using strategies taught in therapy  (approp and ongoing)  4. Pt will demonstrate independence with the HEP at discharge.   (approp and ongoing)  5.  Pt will be able to play his guitar for at least 30 mins without pain.  (approp and ongoing)    Reasons for Recertification of Therapy:   To allow patient to complete full allotment of visits so that they may achieve maximum therapeutic benefit      Plan     Updated Certification Period: 8/8/2023 to 9/12/2023  Recommended Treatment Plan: 2 times per week for 5 weeks:     - Patient  "education  - Therapeutic exercise  - Manual therapy  - Performance testing   - Neuromuscular Re-education  - Therapeutic activity   - Modalities    Pt may be seen by PTA as part of the rehabilitation team.     Therapist: Shant Vanessa, PT  8/8/2023    "I certify the need for these services furnished under this plan of treatment and while under my care."    ____________________________________  Physician/Referring Practitioner    _______________  Date of Signature    Shant Vanessa, PT  8/8/2023      I CERTIFY THE NEED FOR THESE SERVICES FURNISHED UNDER THIS PLAN OF TREATMENT AND WHILE UNDER MY CARE    Physician's comments:        Physician's Signature: ___________________________________________________      "

## 2023-08-08 NOTE — PROGRESS NOTES
[facility]AP[facility] AND [facility] - [facility]  Physical Therapy Treatment Note     Name: Curtis Moseley  Clinic Number: [id number]    Therapy Diagnosis:   Encounter Diagnosis   Name Primary?    Bad posture Yes       Physician: Elsie Paredes, *    Visit Date: 2023    Physician Orders: PT Eval and Treat   Medical Diagnosis from Referral:   M50.321 (ICD-10-CM) - Other cervical disc degeneration at C4-C5 level   M54.2 (ICD-10-CM) - Neck pain   Evaluation Date: 2023  Authorization Period Expiration: 2023  Reassessment Due: 2023  Plan of Care Certification Period: Updated to 2023  Visit #/Visits authorized:  (cervical to lumbar transition)    PTA Visit #: 0/5     Time In: 2:40 pm  Time Out: 3:40 pm  Total Billable Time: 55 minutes  INSURANCE and OUTCOMES: LITO/NDI 2/3    Precautions: standard    Pattern of pain determined: 1 PEP    Subjective     Curtis reports his neck has been feeling much better, he would like to finish out his visits doing the lumbar healthy back program. He was unable to come for a few weeks because he contracted COVID    Patient reports tolerating previous visit well  Patient reports their pain to be 0/10 on a 0-10 scale with 0 being no pain and 10 being the worst pain imaginable.  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  Pt goals: relieve pain; fix posture    Objective      Baseline CERVICAL Isometric Testing on Med X equipment:  Testing administered by PT  Date of testin2023  ROM  deg   Max Peak Torque 460    Min Peak Torque 205    Flex/Ext Ratio 2.2:1   % above normative data 14%     Final CERVICAL Isometric Testing on Med X equipment:  Testing administered by PT  Date of testin2023  ROM  deg   Max Peak Torque 579   Min Peak Torque 248   Flex/Ext Ratio 2.33:1   % above normative data 25   15% increase from initial  "evaluation    Baseline LUMBAR Isometric Testing on Med X equipment:  Testing administered by PT  Date of testin2023  ROM 0-42 deg   Max Peak Torque 152   Min Peak Torque 50   Flex/Ext Ratio 3:1   % below normative data 55%     Starting weight 76 ft/lbs    Neck Disability Index Review 2023 7/3/2023   Pain Intensity 1 1   Personal Care (Washing, Dressing, etc.) 0 0   Lifting 0 0   Reading 0 0   Headaches 1 0   Concentration 0 0   Work 0 0   Driving 0 0   Sleeping 0 0   Recreation 0 1   Score: 2 2     Oswestry Questionnaire Review 2023   Pain Intensity 1 1 1   Personal Care (Washing, Dressing) 0 0 0   Lifting 0 0 0   Walking 0 0 0   Sitting 1 1 1   Standing 1 1 1   Sleeping 0 0 0   Social Life 0 0 0   Traveling 0 0 0   Employment/Homemaking 0 0 0   Score 3 3 3           Treatment     Salvatore received the treatments listed below:      Salvatore participated in therapeutic exercises to develop strength, endurance, ROM, flexibility, posture, and core stabilization for 55 minutes including:    EIL 2x10  Cat cow 2x10  Posterior pelvic tilt + bridge 15x3"  Open books x10 ea  Thoracic extension 15x3"  SOC 15x5"    [facility] - Short 2023   Visit Number 11   VAS Pain Rating 0   Treadmill Time (in min.) 5   Retraction in Sitting 10   Retraction with Extension 10   Rotation -   Lumbar Stretches - Slouch 10   Extension in Lying 10   Extension in Standing 10   Cervical Extension - Seat Pad -   Seat Adjustment -   Top Dead Center -   Counterweight -   Cervical Flexion -   Cervical Extension -   Cervical Peak Torque -   Cervical Weight -   Repetitions -   Rating of Perceived Exertion -   Lumbar Extension - Seat Pad 0   Femur Restraint 6   Top Dead Center 24   Counterweight 414   Lumbar Flexion 42   Lumbar Extension 0   Lumbar Peak Torque 152   Lumbar Weight 60   Repetitions 5       NP:  Cervical rotation with towel over pressure 8x5" ea   Upper trap stretch 2x30"  Levator stretch 2x30"  Half " "fr:Pec positional release 2 mins, horizontal abd w/GTB x20, SA punches 4#  Scapular retractions 2x10x3"  Cervical extension supine head off table x1 min  wall angels x10     Peripheral muscle strengthening which included one set of 15-20 repetitions at a slow and controlled 10-13 second per rep pace focused on strengthening supporting musculature in order to improve body mechanics and functional mobility. Patient and therapist focused on proper form during treatment to ensure optimal strengthening of each targeted muscle group.  Machines utilized include torso rotation, leg extension, leg curl, chest press, upright row. Tricep extension, bicep curl, leg press, and hip abduction added visit 3    Pt given cold pack for 5 minutes to lumbar spine in z lying.    Patient Education and Home Exercises     Home exercises include:  Upper trap stretch   Levator stretch   Cervical retraction  Cardio program (V5): 5/26/23  Lifting education (V11):   Fridge Magnet Discharge handout (date given): -    Education provided:   - PT role and POC  - HEP    Written Home Exercises Provided: Patient instructed to cont prior HEP.  Exercises were reviewed and Salvatore was able to demonstrate them prior to the end of the session.  Salvatore demonstrated good  understanding of the education provided.     See EMR under Patient Instructions for exercises provided prior visit.  Assessment   Salvatore presents today without complaints of neck pain, but with some mid and low back pain. Pt requesting to focus on his low and mid pack pain for the remainder of his visits as his neck has been doing much better. Pt was tested on medx and was 55% below normative values for strength. Based on lumbar presentation pt was given updated HEP. Able to demo all with good form. Begin medx lumbar strengthening at 76 ft/lbs next visit. Continue to progress per HB protocol.     Patient is making good progress towards established goals.  Pt will continue to benefit from skilled " outpatient physical therapy to address the deficits stated in the impairment chart, provide pt/family education and to maximize pt's level of independence in the home and community environment.     Anticipated Barriers for therapy: None     Pt's spiritual, cultural and educational needs considered and pt agreeable to plan of care and goals as stated below:     GOALS: Pt is in agreement with the following goals.     Short term goals: 6 weeks or 10 visits   1.  Pt will demonstratte maintained cervical ROM for ease with ADLs and driving  (MET)  2. Pt will demonstrate independence with reducing or controlling symptoms with ther ex, movement, or position independently, able to reduce pain 1-2 points on pain scale using strategies taught in therapy  (MET)  3. Pt will demonstrate increased MedX average isometric strength value by 5% with  when compared to the initial testing resulting in improved ability to perform bending, lifting, and carrying activities safely, confidently.  (MET)  4.  Pt will demonstrate increased lumbar MedX average isometric strength value by 10% with  when compared to the midpoint testing resulting in improved ability to perform bending, lifting, and carrying activities safely, confidently.  (Approp and ongoing)    Long term goals: 10 weeks or 20 visits    1. Pt will demonstrate increased MedX average isometric strength value  by 10% from initial test to improve ability to lift and carry, and sustain good posture while performing ADL's  (MET)   - Pt will demonstrate increased lumbar MedX average isometric strength value by 15% with  when compared to the midpoint testing resulting in improved ability to perform bending, lifting, and carrying activities safely, confidently.  (Approp and ongoing)  2.Pt will demonstrate reduced pain and improved functional outcomes as reported on the FOTO by reaching a limitation score of < or = 20% or less in order to demonstrate subjective improvement in pt's  condition.    (MET)  3. Pt will demonstrate independence with reducing or controlling symptoms with ther ex, movement, or position independently, able to reduce pain 2-4 points on pain scale using strategies taught in therapy  (approp and ongoing)  4. Pt will demonstrate independence with the HEP at discharge.   (approp and ongoing)  5.  Pt will be able to play his guitar for at least 30 mins without pain.  (approp and ongoing)    Plan     Continue with established Plan of Care towards established PT goals.     Therapist: Shant Vanessa, PT  8/8/2023

## 2023-08-15 ENCOUNTER — CLINICAL SUPPORT (OUTPATIENT)
Dept: REHABILITATION | Facility: OTHER | Age: 33
End: 2023-08-15
Payer: COMMERCIAL

## 2023-08-15 DIAGNOSIS — R29.3 BAD POSTURE: Primary | ICD-10-CM

## 2023-08-15 PROCEDURE — 97750 PHYSICAL PERFORMANCE TEST: CPT | Mod: 32

## 2023-08-15 NOTE — PROGRESS NOTES
[facility]  Physical Therapy Treatment Note     Name: Curtis Moseley  Clinic Number: [id number]    Therapy Diagnosis:   Encounter Diagnosis   Name Primary?    Bad posture Yes       Physician: Elsie Paredes, *    Visit Date: 8/15/2023    Physician Orders: PT Eval and Treat   Medical Diagnosis from Referral:   M50.321 (ICD-10-CM) - Other cervical disc degeneration at C4-C5 level   M54.2 (ICD-10-CM) - Neck pain   Evaluation Date: 2023  Authorization Period Expiration: 2023  Reassessment Due: 2023  Plan of Care Certification Period: Updated to 2023  Visit #/Visits authorized:  (cervical to lumbar transition)    PTA Visit #: 0/5     Time In: 10:40  Time Out: 11:30  Total Billable Time: 50 minutes  INSURANCE and OUTCOMES: LITO/NDI 2/3    Precautions: standard    Pattern of pain determined: 1 PEP    Subjective     Curtis he had to be at work pretty early so he didn't get many stretches in this morning.     Patient reports tolerating previous visit well  Patient reports their pain to be 0/10 on a 0-10 scale with 0 being no pain and 10 being the worst pain imaginable.  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  Pt goals: relieve pain; fix posture    Objective      Baseline CERVICAL Isometric Testing on Med X equipment:  Testing administered by PT  Date of testin2023  ROM  deg   Max Peak Torque 460    Min Peak Torque 205    Flex/Ext Ratio 2.2:1   % above normative data 14%     Final CERVICAL Isometric Testing on Med X equipment:  Testing administered by PT  Date of testin2023  ROM  deg   Max Peak Torque 579   Min Peak Torque 248   Flex/Ext Ratio 2.33:1   % above normative data 25   15% increase from initial evaluation    Baseline LUMBAR Isometric Testing on Med X equipment:  Testing administered by PT  Date of testing:  "8/8/2023  ROM 0-42 deg   Max Peak Torque 152   Min Peak Torque 50   Flex/Ext Ratio 3:1   % below normative data 55%     Starting weight 76 ft/lbs    Neck Disability Index Review 8/8/2023 7/3/2023   Pain Intensity 1 1   Personal Care (Washing, Dressing, etc.) 0 0   Lifting 0 0   Reading 0 0   Headaches 1 0   Concentration 0 0   Work 0 0   Driving 0 0   Sleeping 0 0   Recreation 0 1   Score: 2 2     Oswestry Questionnaire Review 8/8/2023 8/6/2023 4/19/2023   Pain Intensity 1 1 1   Personal Care (Washing, Dressing) 0 0 0   Lifting 0 0 0   Walking 0 0 0   Sitting 1 1 1   Standing 1 1 1   Sleeping 0 0 0   Social Life 0 0 0   Traveling 0 0 0   Employment/Homemaking 0 0 0   Score 3 3 3           Treatment     Salvatore received the treatments listed below:      Salvatore participated in therapeutic exercises to develop strength, endurance, ROM, flexibility, posture, and core stabilization for 55 minutes including:      Cat cow 2x10  Prone on elbows 1 min  EIL 2x10  Open books x10 ea  Posterior pelvic tilt + bridge 15x3"  +TrA isometric with ball x10  Thoracic extension 15x3"  SOC 10x5"    [facility] 8/15/2023   Visit Number 12   VAS Pain Rating 0   Treadmill Time (in min.) -   Retraction in Sitting -   Retraction with Extension -   Rotation -   Lumbar Stretches - Slouch Overcorrection 10   Extension in Lying 20   Extension in Standing -   Test Percent Below Normative Data -   Lumbar Weight 76   Repetitions 16   Rating of Perceived Exertion 4   Ice - Z Lie (in min.) 5         NP:  Cervical rotation with towel over pressure 8x5" ea   Upper trap stretch 2x30"  Levator stretch 2x30"  Half fr:Pec positional release 2 mins, horizontal abd w/GTB x20, SA punches 4#  Scapular retractions 2x10x3"  Cervical extension supine head off table x1 min  wall angels x10     Peripheral muscle strengthening which included one set of 15-20 repetitions at a slow and controlled 10-13 second per rep pace focused on strengthening supporting " musculature in order to improve body mechanics and functional mobility. Patient and therapist focused on proper form during treatment to ensure optimal strengthening of each targeted muscle group.  Machines utilized include torso rotation, leg extension, leg curl, chest press, upright row. Tricep extension, bicep curl, leg press, and hip abduction added visit 3    Pt given cold pack for 5 minutes to lumbar spine in z lying.    Patient Education and Home Exercises     Home exercises include:  Upper trap stretch   Levator stretch   Cervical retraction  Cardio program (V5): 5/26/23  Lifting education (V11):   Fridge Magnet Discharge handout (date given): -    Education provided:   - PT role and POC  - HEP    Written Home Exercises Provided: Patient instructed to cont prior HEP.  Exercises were reviewed and Salvatore was able to demonstrate them prior to the end of the session.  Salvatore demonstrated good  understanding of the education provided.     See EMR under Patient Instructions for exercises provided prior visit.  Assessment   Salvatore presents to therapy with no complaints of pain and stating the back feels about the same. Resumed stretches and added core stabilization TrA isometric with ball this visit with cues to coordinate breathing. He reports feeling the activation of the abdominal muscles. Pt was able to start strengthening and endurance training on the lumbar MedX at 50%/ of max peak torque according to the initial visit isometric test and flexion extension strength ratio. Pt was able to complete 16 reps, with 4/10 RPE. Will continue to progress as tolerated     Patient is making good progress towards established goals.  Pt will continue to benefit from skilled outpatient physical therapy to address the deficits stated in the impairment chart, provide pt/family education and to maximize pt's level of independence in the home and community environment.     Anticipated Barriers for therapy: None     Pt's spiritual,  cultural and educational needs considered and pt agreeable to plan of care and goals as stated below:     GOALS: Pt is in agreement with the following goals.     Short term goals: 6 weeks or 10 visits   1.  Pt will demonstratte maintained cervical ROM for ease with ADLs and driving  (MET)  2. Pt will demonstrate independence with reducing or controlling symptoms with ther ex, movement, or position independently, able to reduce pain 1-2 points on pain scale using strategies taught in therapy  (MET)  3. Pt will demonstrate increased MedX average isometric strength value by 5% with  when compared to the initial testing resulting in improved ability to perform bending, lifting, and carrying activities safely, confidently.  (MET)  4.  Pt will demonstrate increased lumbar MedX average isometric strength value by 10% with  when compared to the midpoint testing resulting in improved ability to perform bending, lifting, and carrying activities safely, confidently.  (Approp and ongoing)    Long term goals: 10 weeks or 20 visits    1. Pt will demonstrate increased MedX average isometric strength value  by 10% from initial test to improve ability to lift and carry, and sustain good posture while performing ADL's  (MET)   - Pt will demonstrate increased lumbar MedX average isometric strength value by 15% with  when compared to the midpoint testing resulting in improved ability to perform bending, lifting, and carrying activities safely, confidently.  (Approp and ongoing)  2.Pt will demonstrate reduced pain and improved functional outcomes as reported on the FOTO by reaching a limitation score of < or = 20% or less in order to demonstrate subjective improvement in pt's condition.    (MET)  3. Pt will demonstrate independence with reducing or controlling symptoms with ther ex, movement, or position independently, able to reduce pain 2-4 points on pain scale using strategies taught in therapy  (approp and ongoing)  4. Pt will  demonstrate independence with the HEP at discharge.   (approp and ongoing)  5.  Pt will be able to play his guitar for at least 30 mins without pain.  (approp and ongoing)    Plan     Continue with established Plan of Care towards established PT goals.     Therapist: Kala Virk, PT  8/15/2023

## 2023-08-17 ENCOUNTER — CLINICAL SUPPORT (OUTPATIENT)
Dept: REHABILITATION | Facility: OTHER | Age: 33
End: 2023-08-17
Payer: COMMERCIAL

## 2023-08-17 DIAGNOSIS — R29.3 BAD POSTURE: Primary | ICD-10-CM

## 2023-08-17 PROCEDURE — 97750 PHYSICAL PERFORMANCE TEST: CPT | Mod: 32

## 2023-08-17 NOTE — PROGRESS NOTES
[facility] - [facility]  Physical Therapy Treatment Note     Name: Curtis Moseley  Clinic Number: [id number]    Therapy Diagnosis:   Encounter Diagnosis   Name Primary?    Bad posture Yes       Physician: Elsie Paredes, *    Visit Date: 2023    Physician Orders: PT Eval and Treat   Medical Diagnosis from Referral:   M50.321 (ICD-10-CM) - Other cervical disc degeneration at C4-C5 level   M54.2 (ICD-10-CM) - Neck pain   Evaluation Date: 2023  Authorization Period Expiration: 2023  Reassessment Due: 2023  Plan of Care Certification Period: Updated to 2023  Visit #/Visits authorized:  (cervical to lumbar transition)    PTA Visit #: 0/5     Time In: 2:30  Time Out:  2:20  Total Billable Time: 50 minutes  INSURANCE and OUTCOMES: LITO/NDI 2/3    Precautions: standard    Pattern of pain determined: 1 PEP    Subjective     Curtis he is not having any pain but having some soreness because of workouts he is doing at home    Patient reports tolerating previous visit well  Patient reports their pain to be 0/10 on a 0-10 scale with 0 being no pain and 10 being the worst pain imaginable.  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  Pt goals: relieve pain; fix posture    Objective      Baseline CERVICAL Isometric Testing on Med X equipment:  Testing administered by PT  Date of testin2023  ROM  deg   Max Peak Torque 460    Min Peak Torque 205    Flex/Ext Ratio 2.2:1   % above normative data 14%     Final CERVICAL Isometric Testing on Med X equipment:  Testing administered by PT  Date of testin2023  ROM  deg   Max Peak Torque 579   Min Peak Torque 248   Flex/Ext Ratio 2.33:1   % above normative data 25   15% increase from initial evaluation    Baseline LUMBAR Isometric Testing on Med X equipment:  Testing administered by PT  Date of  "testin2023  ROM 0-42 deg   Max Peak Torque 152   Min Peak Torque 50   Flex/Ext Ratio 3:1   % below normative data 55%     Starting weight 76 ft/lbs    Treatment     Salvatore received the treatments listed below:      Salvatore participated in therapeutic exercises to develop strength, endurance, ROM, flexibility, posture, and core stabilization for 55 minutes including:      Cat cow 2x10  Prone on elbows 1 min  EIL 2x10  Posterior pelvic tilt + bridge 20x3" BTB  TrA isometric with ball x10  +TrA isometric with ball x10 + SLR  Thoracic extension 15x3"  +Palloff press GTB x15    [facility] 2023   Visit Number 13   VAS Pain Rating 0   Treadmill Time (in min.) -   Retraction in Sitting -   Retraction with Extension -   Rotation -   Lumbar Stretches - Slouch Overcorrection 10   Extension in Lying 20   Extension in Standing -   Cervical Extension Seat Pad -   Min Torque -   Test Percent Below Normative Data -   Lumbar Weight 76   Repetitions 17   Rating of Perceived Exertion 6   Ice - Z Lie (in min.) 0       NP:  Open books x10 ea  SOC 10x5"  Cervical rotation with towel over pressure 8x5" ea   Upper trap stretch 2x30"  Levator stretch 2x30"  Half fr:Pec positional release 2 mins, horizontal abd w/GTB x20, SA punches 4#  Scapular retractions 2x10x3"  Cervical extension supine head off table x1 min  wall angels x10     Peripheral muscle strengthening which included one set of 15-20 repetitions at a slow and controlled 10-13 second per rep pace focused on strengthening supporting musculature in order to improve body mechanics and functional mobility. Patient and therapist focused on proper form during treatment to ensure optimal strengthening of each targeted muscle group.  Machines utilized include torso rotation, leg extension, leg curl, chest press, upright row. Tricep extension, bicep curl, leg press, and hip abduction added visit 3    Pt given cold pack for 5 minutes to lumbar spine in z lying.    Patient " Education and Home Exercises     Home exercises include:  Upper trap stretch   Levator stretch   Cervical retraction  Cardio program (V5): 5/26/23  Lifting education (V11):   Fridge Magnet Discharge handout (date given): -    Education provided:   - PT role and POC  - HEP    Written Home Exercises Provided: Patient instructed to cont prior HEP.  Exercises were reviewed and Salvatore was able to demonstrate them prior to the end of the session.  Salvatore demonstrated good  understanding of the education provided.     See EMR under Patient Instructions for exercises provided prior visit.  Assessment     Salvatore presents to therapy with no complaints of pain and stating that he has to leave a little early to get back to work. He has been completing lumbar hyper extensions with weight at the gym and he is sore from that. Resumed strength exercise and mobility, with the addition of TrA with SLR and standing Pallof press for abdominal/core stabilization. Maintained resistance on the lumbar medx and complete 17 repetitions at 6/10 citing soreness in the back. Plan to progress as tolerated. He requires moderate cues for pacing on the peripheral machines    Patient is making good progress towards established goals.  Pt will continue to benefit from skilled outpatient physical therapy to address the deficits stated in the impairment chart, provide pt/family education and to maximize pt's level of independence in the home and community environment.     Anticipated Barriers for therapy: None     Pt's spiritual, cultural and educational needs considered and pt agreeable to plan of care and goals as stated below:     GOALS: Pt is in agreement with the following goals.     Short term goals: 6 weeks or 10 visits   1.  Pt will demonstratte maintained cervical ROM for ease with ADLs and driving  (MET)  2. Pt will demonstrate independence with reducing or controlling symptoms with ther ex, movement, or position independently, able to reduce pain  1-2 points on pain scale using strategies taught in therapy  (MET)  3. Pt will demonstrate increased MedX average isometric strength value by 5% with  when compared to the initial testing resulting in improved ability to perform bending, lifting, and carrying activities safely, confidently.  (MET)  4.  Pt will demonstrate increased lumbar MedX average isometric strength value by 10% with  when compared to the midpoint testing resulting in improved ability to perform bending, lifting, and carrying activities safely, confidently.  (Approp and ongoing)    Long term goals: 10 weeks or 20 visits    1. Pt will demonstrate increased MedX average isometric strength value  by 10% from initial test to improve ability to lift and carry, and sustain good posture while performing ADL's  (MET)   - Pt will demonstrate increased lumbar MedX average isometric strength value by 15% with  when compared to the midpoint testing resulting in improved ability to perform bending, lifting, and carrying activities safely, confidently.  (Approp and ongoing)  2.Pt will demonstrate reduced pain and improved functional outcomes as reported on the FOTO by reaching a limitation score of < or = 20% or less in order to demonstrate subjective improvement in pt's condition.    (MET)  3. Pt will demonstrate independence with reducing or controlling symptoms with ther ex, movement, or position independently, able to reduce pain 2-4 points on pain scale using strategies taught in therapy  (approp and ongoing)  4. Pt will demonstrate independence with the HEP at discharge.   (approp and ongoing)  5.  Pt will be able to play his guitar for at least 30 mins without pain.  (approp and ongoing)    Plan     Continue with established Plan of Care towards established PT goals.     Therapist: Kala Virk, PT  8/17/2023

## 2023-08-21 NOTE — PROGRESS NOTES
"[facility]  Physical Therapy Treatment Note     Name: Curtis Moseley  Clinic Number: [id number]    Therapy Diagnosis:   Encounter Diagnosis   Name Primary?    Bad posture Yes       Physician: Elsie Paredes, *    Visit Date: 8/22/2023    Physician Orders: PT Eval and Treat   Medical Diagnosis from Referral:   M50.321 (ICD-10-CM) - Other cervical disc degeneration at C4-C5 level   M54.2 (ICD-10-CM) - Neck pain   Evaluation Date: 4/19/2023  Authorization Period Expiration: 12/31/2023  Reassessment Due: 09/08/2023  Plan of Care Certification Period: 9/12/2023  Visit #/Visits authorized: 14/20 (cervical to lumbar transition)    PTA Visit #: 0/5     Time In: 11:00 am  Time Out:  12:00 pm   Total Billable Time: 60 minutes  INSURANCE and OUTCOMES: LITO/NDI 2/3    Precautions: standard    Pattern of pain determined: 1 PEP    Subjective     Curtis reports some inc cervical discomfort that he believes is from "sleeping wrong".  Patient note pillow placement can help but when he adjusts from SL to supine posture is lost.  Patient obtained lumbar roll, uses in car and feels it help /c his postural awareness.   Patient report he has been able to play guitar > 30 min /s inc cervical or LB discomfort.  Patient report LB can be exacerbated /c lawn care and household chores (mopping) of > 30 min.  Patient report using HEP to help dec sx intensity.   Patient obtained lumbar roll, uses in car and feels it help /c his postural awareness.   Patient plans to start CrossFit exercises this weekend.        Patient reports tolerating previous visit well /c no c/o of excess discomfort.    Patient reports their pain to be 5/10 cervical 0/10 LB on a 0-10 scale with 0 being no pain and 10 being the worst pain imaginable.  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  Pt goals: relieve pain; fix " "posture    Objective      Baseline CERVICAL Isometric Testing on Med X equipment:  Testing administered by PT  Date of testin2023  ROM  deg   Max Peak Torque 460    Min Peak Torque 205    Flex/Ext Ratio 2.2:1   % above normative data 14%     Final CERVICAL Isometric Testing on Med X equipment:  Testing administered by PT  Date of testin2023  ROM  deg   Max Peak Torque 579   Min Peak Torque 248   Flex/Ext Ratio 2.33:1   % above normative data 25   15% increase from initial evaluation    Baseline LUMBAR Isometric Testing on Med X equipment:  Testing administered by PT  Date of testin2023  ROM 0-42 deg   Max Peak Torque 152   Min Peak Torque 50   Flex/Ext Ratio 3:1   % below normative data 55%   Starting weight 76 ft/lbs        [facility] Questionnaire Review 2023   Pain Intensity 1 1 1   Personal Care (Washing, Dressing) 0 0 0   Lifting 0 0 0   Walking 0 0 0   Sitting 1 1 1   Standing 1 1 1   Sleeping 0 0 0   Social Life 0 0 0   Traveling 0 0 0   Employment/Homemaking 0 0 0   Score 3 3 3             Treatment     Salvatore received the treatments listed below:      Salvatore participated in therapeutic exercises to develop strength, endurance, ROM, flexibility, posture, and core stabilization for 45 minutes including:      Cervical    UT stretch 3 x 20 sec hold    Levator stretch 3 x 20 sec hold    Retraction x 10   Retraction /c ext x 10     Lumbar  Prone on elbows 1 min -> EIL x 20   Cat cow x 10   Seated lumbar ext /c low bolster in chair x 20    PPT x 10 5 sec hold   Bridges x 10 (/c blue TB NP), x 10 /c arms crossed     NP to do lifting education, return next visit:    Posterior pelvic tilt + bridge 20x3" BTB  TrA isometric with ball x10  TrA isometric with ball x10 + SLR  Thoracic extension 15x3"  Palloff press GTB x15    [facility] 2023   Visit Number 14   VAS Pain Rating 0   Treadmill Time (in min.) -   Retraction in Sitting 10   Retraction with " "Extension 10   Rotation -   Lumbar Stretches - Slouch Overcorrection -   Extension in Lying 20   Extension in Standing -   Cervical Extension Seat Pad -   Seat Adjustment -   Top Dead Center -   Counterweight -   Cervical Flexion -   Cervical Extension -   Cervical Peak Torque -   Cervical Weight -   Repetitions -   Rating of Perceived Exertion -   Lumbar Extension Seat Pad -   Femur Restraint -   Top Dead Center -   Counterweight -   Lumbar Flexion 45   Lumbar Extension 0   Lumbar Peak Torque -   Min Torque -   Test Percent Below Normative Data -   Lumbar Weight 76   Repetitions 20   Rating of Perceived Exertion 6   Ice - Z Lie (in min.) 5       NP:  Open books x10 ea  SOC 10x5"  Cervical rotation with towel over pressure 8x5" ea   Upper trap stretch 2x30"  Levator stretch 2x30"  Half fr:Pec positional release 2 mins, horizontal abd w/GTB x20, SA punches 4#  Scapular retractions 2x10x3"  Cervical extension supine head off table x1 min  wall angels x10     Peripheral muscle strengthening which included one set of 15-20 repetitions at a slow and controlled 10-13 second per rep pace focused on strengthening supporting musculature in order to improve body mechanics and functional mobility. Patient and therapist focused on proper form during treatment to ensure optimal strengthening of each targeted muscle group.  Machines utilized include torso rotation, leg extension, leg curl, chest press, upright row. Tricep extension, bicep curl, leg press, and hip abduction added visit 3      Salvatore participated in dynamic functional therapeutic activities to improve functional lifting performance for 15  minutes, including:    Lifting education   Hip Hinge x 10    Free Squat x 10 10# KB    Golfer's Lift x 8 10# KB each side     Pt given cold pack for 5 minutes to lumbar spine in Z lying.    Patient Education and Home Exercises     Home exercises include:  HEP for CERVICAL  Upper trap stretch   Levator stretch   Cervical " retraction    HEP for LB  Cat/cow   Open books   Seated lumbar extension (low bolster)   Planks  Superman    Cardio program (V5): 5/26/23  Lifting education (V11):  08/22/23   Lumbar bolster: obtained, uses in car   Fridge Magnet Discharge handout (date given): -    Education provided:   -patient issued Lifting Do Don'ts handout  ]  Written Home Exercises Provided: Patient instructed to cont prior HEP.  Exercises were reviewed and aSlvatore was able to demonstrate them prior to the end of the session.  Salvatore demonstrated good  understanding of the education provided.     See EMR under Patient Instructions for exercises provided prior visit.  Assessment       Patient subjective report indicate improved body and postural awareness /c application of lumbar roll and pillows.  Patient self efficacy also inc /c note of intent to enroll in CrossFit.  Patient encouraged to continue extension directional preference throughout.  Patient able to verbalize and demonstrate part of LB HEP /c accuracy indicating compliance.  Tx progress /c addition of functional lifting education. Patient able to demonstrate safe lifting technique /c min cue.  Recommend f/u to ensure carry over as patient note primary sx generating activities involve bending and lifting.  Lumbar MedX weight maintained per pt tolerance last visit.   Patient demonstrate comfort /c inc flex range during MedX set up, therefore, inc flex ROM for mobility challenge.  Today pt perform 20 reps at 6 RPE indicating improved strength and mobility.  Progress per HB protocol.       Patient is making good progress towards established goals.  Pt will continue to benefit from skilled outpatient physical therapy to address the deficits stated in the impairment chart, provide pt/family education and to maximize pt's level of independence in the home and community environment.     Anticipated Barriers for therapy: None     Pt's spiritual, cultural and educational needs considered and pt  agreeable to plan of care and goals as stated below:     GOALS: Pt is in agreement with the following goals.     Short term goals: 6 weeks or 10 visits   1.  Pt will demonstratte maintained cervical ROM for ease with ADLs and driving  (MET)  2. Pt will demonstrate independence with reducing or controlling symptoms with ther ex, movement, or position independently, able to reduce pain 1-2 points on pain scale using strategies taught in therapy  (MET)  3. Pt will demonstrate increased MedX average isometric strength value by 5% with  when compared to the initial testing resulting in improved ability to perform bending, lifting, and carrying activities safely, confidently.  (MET)  4.  Pt will demonstrate increased lumbar MedX average isometric strength value by 10% with  when compared to the midpoint testing resulting in improved ability to perform bending, lifting, and carrying activities safely, confidently.  (Approp and ongoing)    Long term goals: 10 weeks or 20 visits    1. Pt will demonstrate increased MedX average isometric strength value  by 10% from initial test to improve ability to lift and carry, and sustain good posture while performing ADL's  (MET)   - Pt will demonstrate increased lumbar MedX average isometric strength value by 15% with  when compared to the midpoint testing resulting in improved ability to perform bending, lifting, and carrying activities safely, confidently.  (Approp and ongoing)  2.Pt will demonstrate reduced pain and improved functional outcomes as reported on the FOTO by reaching a limitation score of < or = 20% or less in order to demonstrate subjective improvement in pt's condition.    (MET)  3. Pt will demonstrate independence with reducing or controlling symptoms with ther ex, movement, or position independently, able to reduce pain 2-4 points on pain scale using strategies taught in therapy  (approp and ongoing)  4. Pt will demonstrate independence with the HEP at discharge.    (approp and ongoing)  5.  Pt will be able to play his guitar for at least 30 mins without pain.  (approp and ongoing)    Plan     Continue with established Plan of Care towards established PT goals.     Therapist: Taz Celeste, PT  8/22/2023

## 2023-08-22 ENCOUNTER — CLINICAL SUPPORT (OUTPATIENT)
Dept: REHABILITATION | Facility: OTHER | Age: 33
End: 2023-08-22
Payer: COMMERCIAL

## 2023-08-22 DIAGNOSIS — R29.3 BAD POSTURE: Primary | ICD-10-CM

## 2023-08-22 PROCEDURE — 97750 PHYSICAL PERFORMANCE TEST: CPT | Mod: 32

## 2023-08-23 ENCOUNTER — OFFICE VISIT (OUTPATIENT)
Dept: INTERNAL MEDICINE | Facility: CLINIC | Age: 33
End: 2023-08-23
Attending: INTERNAL MEDICINE
Payer: COMMERCIAL

## 2023-08-23 VITALS
WEIGHT: 241 LBS | OXYGEN SATURATION: 99 % | BODY MASS INDEX: 31.94 KG/M2 | HEIGHT: 73 IN | SYSTOLIC BLOOD PRESSURE: 112 MMHG | HEART RATE: 87 BPM | DIASTOLIC BLOOD PRESSURE: 74 MMHG

## 2023-08-23 DIAGNOSIS — R76.8 HEPATITIS B CORE ANTIBODY POSITIVE: ICD-10-CM

## 2023-08-23 DIAGNOSIS — E66.9 OBESITY (BMI 30.0-34.9): Primary | ICD-10-CM

## 2023-08-23 DIAGNOSIS — E55.9 VITAMIN D DEFICIENCY: ICD-10-CM

## 2023-08-23 PROBLEM — R74.01 ELEVATED TRANSAMINASE LEVEL: Status: RESOLVED | Noted: 2021-05-05 | Resolved: 2023-08-23

## 2023-08-23 PROCEDURE — 99214 PR OFFICE/OUTPT VISIT, EST, LEVL IV, 30-39 MIN: ICD-10-PCS | Mod: S$GLB,,, | Performed by: INTERNAL MEDICINE

## 2023-08-23 PROCEDURE — 1159F MED LIST DOCD IN RCRD: CPT | Mod: CPTII,S$GLB,, | Performed by: INTERNAL MEDICINE

## 2023-08-23 PROCEDURE — 3008F BODY MASS INDEX DOCD: CPT | Mod: CPTII,S$GLB,, | Performed by: INTERNAL MEDICINE

## 2023-08-23 PROCEDURE — 3074F SYST BP LT 130 MM HG: CPT | Mod: CPTII,S$GLB,, | Performed by: INTERNAL MEDICINE

## 2023-08-23 PROCEDURE — 99214 OFFICE O/P EST MOD 30 MIN: CPT | Mod: S$GLB,,, | Performed by: INTERNAL MEDICINE

## 2023-08-23 PROCEDURE — 1160F RVW MEDS BY RX/DR IN RCRD: CPT | Mod: CPTII,S$GLB,, | Performed by: INTERNAL MEDICINE

## 2023-08-23 PROCEDURE — 3044F PR MOST RECENT HEMOGLOBIN A1C LEVEL <7.0%: ICD-10-PCS | Mod: CPTII,S$GLB,, | Performed by: INTERNAL MEDICINE

## 2023-08-23 PROCEDURE — 3074F PR MOST RECENT SYSTOLIC BLOOD PRESSURE < 130 MM HG: ICD-10-PCS | Mod: CPTII,S$GLB,, | Performed by: INTERNAL MEDICINE

## 2023-08-23 PROCEDURE — 1159F PR MEDICATION LIST DOCUMENTED IN MEDICAL RECORD: ICD-10-PCS | Mod: CPTII,S$GLB,, | Performed by: INTERNAL MEDICINE

## 2023-08-23 PROCEDURE — 3044F HG A1C LEVEL LT 7.0%: CPT | Mod: CPTII,S$GLB,, | Performed by: INTERNAL MEDICINE

## 2023-08-23 PROCEDURE — 1160F PR REVIEW ALL MEDS BY PRESCRIBER/CLIN PHARMACIST DOCUMENTED: ICD-10-PCS | Mod: CPTII,S$GLB,, | Performed by: INTERNAL MEDICINE

## 2023-08-23 PROCEDURE — 3008F PR BODY MASS INDEX (BMI) DOCUMENTED: ICD-10-PCS | Mod: CPTII,S$GLB,, | Performed by: INTERNAL MEDICINE

## 2023-08-23 PROCEDURE — 3078F DIAST BP <80 MM HG: CPT | Mod: CPTII,S$GLB,, | Performed by: INTERNAL MEDICINE

## 2023-08-23 PROCEDURE — 3078F PR MOST RECENT DIASTOLIC BLOOD PRESSURE < 80 MM HG: ICD-10-PCS | Mod: CPTII,S$GLB,, | Performed by: INTERNAL MEDICINE

## 2023-08-23 RX ORDER — PHENTERMINE HYDROCHLORIDE 37.5 MG/1
TABLET ORAL
Qty: 30 TABLET | Refills: 1 | Status: SHIPPED | OUTPATIENT
Start: 2023-08-23 | End: 2024-01-03 | Stop reason: SDUPTHER

## 2023-08-23 NOTE — PROGRESS NOTES
Subjective     Patient ID: Curtis Moseley is a 32 y.o. male.    Chief Complaint: Follow-up (Gave blood and HEP B antigen came back positive which was never an issue before would like to get re checked, would like to discuss skin removal surgery )    He has lost another 11 pounds over the past 4 months, for a total of 114 pounds.  Takes Adipex every other day and follows weight watchers.    He gave blood recently and got a letter stating that he was hepatitis B core antibody positive.  The letter stated it was likely a false-positive but that he could not donate blood anymore.  He believes they checked a hepatitis B viral load which was negative    Follow-up  This is a chronic problem. The current episode started more than 1 year ago. The problem has been gradually improving.     Review of Systems   Constitutional: Negative.    Respiratory: Negative.     Cardiovascular: Negative.           Objective     Physical Exam  Vitals and nursing note reviewed.   Constitutional:       Appearance: He is well-developed.   HENT:      Head: Normocephalic and atraumatic.   Eyes:      Pupils: Pupils are equal, round, and reactive to light.   Cardiovascular:      Rate and Rhythm: Normal rate and regular rhythm.      Heart sounds: Normal heart sounds.   Pulmonary:      Effort: Pulmonary effort is normal.   Neurological:      Mental Status: He is alert.            Assessment and Plan     1. Obesity (BMI 30.0-34.9)  Overview:  Max wt = 355 ib in 7/22      2. Vitamin D deficiency    3. Hepatitis B core antibody positive    Other orders  -     phentermine (ADIPEX-P) 37.5 mg tablet; Take 1/2-1 tablet by mouth each morning  Dispense: 30 tablet; Refill: 1        Per orders and D/C instructions.  Continue weight watchers diet and Adipex for obesity, which is improving.  Continue vitamin-D for history of vitamin-D deficiency.  He will get me a copy of the recent labs done by the blood bank which included a positive test for hepatitis B core  antibody.  We will consider repeating hepatitis-B core antibody both IgG and IgM as well as hepatitis B e antibody and antigen, and possibly a hepatitis-B viral load.    Between 30 and 39 min of total time for evaluation and management services were spent on the patient today.  The medical problems and treatment options were discussed, and all questions were answered.             Follow up in about 4 months (around 12/23/2023).

## 2023-08-24 ENCOUNTER — CLINICAL SUPPORT (OUTPATIENT)
Dept: REHABILITATION | Facility: OTHER | Age: 33
End: 2023-08-24
Payer: COMMERCIAL

## 2023-08-24 DIAGNOSIS — R29.3 BAD POSTURE: Primary | ICD-10-CM

## 2023-08-24 PROCEDURE — 97750 PHYSICAL PERFORMANCE TEST: CPT | Mod: 32

## 2023-08-24 NOTE — PROGRESS NOTES
[facility]  Physical Therapy Treatment Note     Name: Curtis Moseley  Clinic Number: [id number]    Therapy Diagnosis:   Encounter Diagnosis   Name Primary?    Bad posture Yes       Physician: Elsie Paredes, *    Visit Date: 2023    Physician Orders: PT Eval and Treat   Medical Diagnosis from Referral:   M50.321 (ICD-10-CM) - Other cervical disc degeneration at C4-C5 level   M54.2 (ICD-10-CM) - Neck pain   Evaluation Date: 2023  Authorization Period Expiration: 2023  Reassessment Due: 2023  Plan of Care Certification Period: 2023  Visit #/Visits authorized: 15/20 (cervical to lumbar transition)    PTA Visit #: 0/5     Time In: 2:25  Time Out:  3:17  Total Billable Time: 52 minutes  INSURANCE and OUTCOMES: LITO/NDI 2/3    Precautions: standard    Pattern of pain determined: 1 PEP    Subjective     Curtis reports the neck is feeling better today, he thinks that he just had a bad nights rest and went back to 1 pillow. The back is feeling good.    Patient reports tolerating previous visit well /c no c/o of excess discomfort.    Patient reports their pain to be 5/10 cervical 0/10 LB on a 0-10 scale with 0 being no pain and 10 being the worst pain imaginable.  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  Pt goals: relieve pain; fix posture    Objective      Baseline CERVICAL Isometric Testing on Med X equipment:  Testing administered by PT  Date of testin2023  ROM  deg   Max Peak Torque 460    Min Peak Torque 205    Flex/Ext Ratio 2.2:1   % above normative data 14%     Final CERVICAL Isometric Testing on Med X equipment:  Testing administered by PT  Date of testin2023  ROM  deg   Max Peak Torque 579   Min Peak Torque 248   Flex/Ext Ratio 2.33:1   % above normative data 25   15% increase from initial evaluation    Baseline  "LUMBAR Isometric Testing on Med X equipment:  Testing administered by PT  Date of testin2023  ROM 0-42 deg   Max Peak Torque 152   Min Peak Torque 50   Flex/Ext Ratio 3:1   % below normative data 55%   Starting weight 76 ft/lbs        Treatment     Salvatore received the treatments listed below:      Salvatore participated in therapeutic exercises to develop strength, endurance, ROM, flexibility, posture, and core stabilization for 45 minutes including:      Prone on elbows 1 min -> EIL x 20   Cat cow x 10   +Bird dog x10  Bridges PPT x 10 5 sec hold /c blue TB /c arms crossed   +Bridge with kick 2x10  Palloff press GTB x15  +Slight hip hinge with dumbbell row 7#      NP to do lifting education, return next visit:    Seated lumbar ext /c low bolster in chair x 20    Posterior pelvic tilt + bridge 20x3" BTB  TrA isometric with ball x10  TrA isometric with ball x10 + SLR  Thoracic extension 15x3"        2023     2:54 PM   [facility]   Visit Number 15   VAS Pain Rating 0   Treadmill Time (in min.) 5 min   Extension in Lying 20   Lumbar Weight 80 lbs   Repetitions 15   Rating of Perceived Exertion 6   Ice - Z Lie (in min.) 5       .hbt    NP:  Open books x10 ea  SOC 10x5"  Cervical rotation with towel over pressure 8x5" ea   Upper trap stretch 2x30"  Levator stretch 2x30"  Half fr:Pec positional release 2 mins, horizontal abd w/GTB x20, SA punches 4#  Scapular retractions 2x10x3"  Cervical extension supine head off table x1 min  wall angels x10     Peripheral muscle strengthening which included one set of 15-20 repetitions at a slow and controlled 10-13 second per rep pace focused on strengthening supporting musculature in order to improve body mechanics and functional mobility. Patient and therapist focused on proper form during treatment to ensure optimal strengthening of each targeted muscle group.  Machines utilized include torso rotation, leg extension, leg curl, chest press, upright row. Tricep " extension, bicep curl, leg press, and hip abduction added visit 3      Salvatore participated in dynamic functional therapeutic activities to improve functional lifting performance for 00 minutes, including:      Pt given cold pack for 5 minutes to lumbar spine in Z lying.    Patient Education and Home Exercises     Home exercises include:  HEP for CERVICAL  Upper trap stretch   Levator stretch   Cervical retraction    HEP for LB  Cat/cow   Open books   Seated lumbar extension (low bolster)   Planks  Superman    Cardio program (V5): 5/26/23  Lifting education (V11):  08/22/23   Lumbar bolster: obtained, uses in car   Fridge Magnet Discharge handout (date given): -    Education provided:   -patient issued Lifting Do Don'ts handout  ]  Written Home Exercises Provided: Patient instructed to cont prior HEP.  Exercises were reviewed and Salvatore was able to demonstrate them prior to the end of the session.  Salvatore demonstrated good  understanding of the education provided.     See EMR under Patient Instructions for exercises provided prior visit.  Assessment     Salvatore presents to visit without increased low back pain and is planning on doing a 3 visit trail of Crossfit to build muscle and get more fit after losing weight. Continued to work on extension strength and unilateral multifidus and lumbar extensor strength. Attempted to start working on dead lift/ hip hinge lift and he had continued rounding at the shoulders and flexing in the thoracic spine. Modified exercises to bent over row and he reports being able to feel the muscle working. Increased resistance on the lumbar medx and he was able to complete 15 repetitions at 6/10 RPE.       Patient is making good progress towards established goals.  Pt will continue to benefit from skilled outpatient physical therapy to address the deficits stated in the impairment chart, provide pt/family education and to maximize pt's level of independence in the home and community environment.      Anticipated Barriers for therapy: None     Pt's spiritual, cultural and educational needs considered and pt agreeable to plan of care and goals as stated below:     GOALS: Pt is in agreement with the following goals.     Short term goals: 6 weeks or 10 visits   1.  Pt will demonstratte maintained cervical ROM for ease with ADLs and driving  (MET)  2. Pt will demonstrate independence with reducing or controlling symptoms with ther ex, movement, or position independently, able to reduce pain 1-2 points on pain scale using strategies taught in therapy  (MET)  3. Pt will demonstrate increased MedX average isometric strength value by 5% with  when compared to the initial testing resulting in improved ability to perform bending, lifting, and carrying activities safely, confidently.  (MET)  4.  Pt will demonstrate increased lumbar MedX average isometric strength value by 10% with  when compared to the midpoint testing resulting in improved ability to perform bending, lifting, and carrying activities safely, confidently.  (Approp and ongoing)    Long term goals: 10 weeks or 20 visits    1. Pt will demonstrate increased MedX average isometric strength value  by 10% from initial test to improve ability to lift and carry, and sustain good posture while performing ADL's  (MET)   - Pt will demonstrate increased lumbar MedX average isometric strength value by 15% with  when compared to the midpoint testing resulting in improved ability to perform bending, lifting, and carrying activities safely, confidently.  (Approp and ongoing)  2.Pt will demonstrate reduced pain and improved functional outcomes as reported on the FOTO by reaching a limitation score of < or = 20% or less in order to demonstrate subjective improvement in pt's condition.    (MET)  3. Pt will demonstrate independence with reducing or controlling symptoms with ther ex, movement, or position independently, able to reduce pain 2-4 points on pain scale using  strategies taught in therapy  (approp and ongoing)  4. Pt will demonstrate independence with the HEP at discharge.   (approp and ongoing)  5.  Pt will be able to play his guitar for at least 30 mins without pain.  (approp and ongoing)    Plan     Continue with established Plan of Care towards established PT goals.     Therapist: Kala Virk, PT  8/24/2023

## 2023-09-08 ENCOUNTER — CLINICAL SUPPORT (OUTPATIENT)
Dept: REHABILITATION | Facility: OTHER | Age: 33
End: 2023-09-08
Payer: COMMERCIAL

## 2023-09-08 DIAGNOSIS — R29.3 BAD POSTURE: Primary | ICD-10-CM

## 2023-09-08 PROCEDURE — 97750 PHYSICAL PERFORMANCE TEST: CPT | Mod: 32

## 2023-09-08 NOTE — PROGRESS NOTES
[facility]  Physical Therapy Treatment Note     Name: Curtis Moseley  Clinic Number: [id number]    Therapy Diagnosis:   Encounter Diagnosis   Name Primary?    Bad posture Yes     Physician: Elsie Paredes, *    Visit Date: 2023    Physician Orders: PT Eval and Treat   Medical Diagnosis from Referral:   M50.321 (ICD-10-CM) - Other cervical disc degeneration at C4-C5 level   M54.2 (ICD-10-CM) - Neck pain   Evaluation Date: 2023  Authorization Period Expiration: 2023  Reassessment Due: 2023  Plan of Care Certification Period: 2023  Visit #/Visits authorized:  (cervical to lumbar transition)    PTA Visit #: 0/5     Time In: 1330  Time Out: 1430  Total Billable Time: 55 minutes  INSURANCE and OUTCOMES: LITO/NDI 2/3    Precautions: standard    Pattern of pain determined: 1 PEP    Subjective     Curtis reports no new issues today. He has been doing his HEP and started his crossfit classes.     Patient reports tolerating previous visit well /c no c/o of excess discomfort.    Patient reports their pain to be 5/10 cervical 0/10 LB on a 0-10 scale with 0 being no pain and 10 being the worst pain imaginable.  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  Pt goals: relieve pain; fix posture    Objective      Baseline CERVICAL Isometric Testing on Med X equipment:  Testing administered by PT  Date of testin2023  ROM  deg   Max Peak Torque 460    Min Peak Torque 205    Flex/Ext Ratio 2.2:1   % above normative data 14%     Final CERVICAL Isometric Testing on Med X equipment:  Testing administered by PT  Date of testin2023  ROM  deg   Max Peak Torque 579   Min Peak Torque 248   Flex/Ext Ratio 2.33:1   % above normative data 25   15% increase from initial evaluation    Baseline LUMBAR Isometric Testing on Med X equipment:  Testing  "administered by PT  Date of testin2023  ROM 0-42 deg   Max Peak Torque 152   Min Peak Torque 50   Flex/Ext Ratio 3:1   % below normative data 55%   Starting weight 76 ft/lbs        Treatment     Salvatore received the treatments listed below:      Salvatore participated in therapeutic exercises to develop strength, endurance, ROM, flexibility, posture, and core stabilization for 45 minutes including:      Prone on elbows 1 min -> EIL x 20   Cat cow x 10   Bird dog x10  Bridges PPT x 10 5 sec hold /c blue TB /c arms crossed   Bridge with kick x10  Palloff press GTB x15  Slight hip hinge with dumbbell row 7#   Seated lumbar ext /c low bolster in chair x 20    TrA isometric with ball x10  TrA isometric with ball x10 + SLR  Thoracic extension 15x3"        2023     1:39 PM   [facility]   Visit Number 16   VAS Pain Rating 3   Lumbar Weight 80 lbs   Repetitions 16   Rating of Perceived Exertion 7   Ice - Z Lie (in min.) 5        NP:  Open books x10 ea  SOC 10x5"  Cervical rotation with towel over pressure 8x5" ea   Upper trap stretch 2x30"  Levator stretch 2x30"  Half fr:Pec positional release 2 mins, horizontal abd w/GTB x20, SA punches 4#  Scapular retractions 2x10x3"  Cervical extension supine head off table x1 min  wall angels x10     Peripheral muscle strengthening which included one set of 15-20 repetitions at a slow and controlled 10-13 second per rep pace focused on strengthening supporting musculature in order to improve body mechanics and functional mobility. Patient and therapist focused on proper form during treatment to ensure optimal strengthening of each targeted muscle group.  Machines utilized include torso rotation, leg extension, leg curl, chest press, upright row. Tricep extension, bicep curl, leg press, and hip abduction added visit 3      Salvatore participated in dynamic functional therapeutic activities to improve functional lifting performance for 00 minutes, including:      Pt given cold pack " for 5 minutes to lumbar spine in Z lying.    Patient Education and Home Exercises     Home exercises include:  HEP for CERVICAL  Upper trap stretch   Levator stretch   Cervical retraction    HEP for LB  Cat/cow   Open books   Seated lumbar extension (low bolster)   Planks  Superman    Cardio program (V5): 5/26/23  Lifting education (V11):  08/22/23   Lumbar bolster: obtained, uses in car   Fridge Magnet Discharge handout (date given): -    Education provided:   -patient issued Lifting Do Don'ts handout  ]  Written Home Exercises Provided: Patient instructed to cont prior HEP.  Exercises were reviewed and Salvatore was able to demonstrate them prior to the end of the session.  Salvatore demonstrated good  understanding of the education provided.     See EMR under Patient Instructions for exercises provided prior visit.  Assessment     Curtis tolerated treatment well today. Reports no new issues. Continued lumbopelvic exercises to improve strength and mobility. Patient able to perform 16 reps at prior resistance on MedX machine with appropriate exertion. Tolerated all activity well today. Continues to tolerate peripheral exercises well. Will continue to progress as able.      Patient is making good progress towards established goals.  Pt will continue to benefit from skilled outpatient physical therapy to address the deficits stated in the impairment chart, provide pt/family education and to maximize pt's level of independence in the home and community environment.     Anticipated Barriers for therapy: None     Pt's spiritual, cultural and educational needs considered and pt agreeable to plan of care and goals as stated below:     GOALS: Pt is in agreement with the following goals.     Short term goals: 6 weeks or 10 visits   1.  Pt will demonstratte maintained cervical ROM for ease with ADLs and driving  (MET)  2. Pt will demonstrate independence with reducing or controlling symptoms with ther ex, movement, or position  independently, able to reduce pain 1-2 points on pain scale using strategies taught in therapy  (MET)  3. Pt will demonstrate increased MedX average isometric strength value by 5% with  when compared to the initial testing resulting in improved ability to perform bending, lifting, and carrying activities safely, confidently.  (MET)  4.  Pt will demonstrate increased lumbar MedX average isometric strength value by 10% with  when compared to the midpoint testing resulting in improved ability to perform bending, lifting, and carrying activities safely, confidently.  (Approp and ongoing)    Long term goals: 10 weeks or 20 visits    1. Pt will demonstrate increased MedX average isometric strength value  by 10% from initial test to improve ability to lift and carry, and sustain good posture while performing ADL's  (MET)   - Pt will demonstrate increased lumbar MedX average isometric strength value by 15% with  when compared to the midpoint testing resulting in improved ability to perform bending, lifting, and carrying activities safely, confidently.  (Approp and ongoing)  2.Pt will demonstrate reduced pain and improved functional outcomes as reported on the FOTO by reaching a limitation score of < or = 20% or less in order to demonstrate subjective improvement in pt's condition.    (MET)  3. Pt will demonstrate independence with reducing or controlling symptoms with ther ex, movement, or position independently, able to reduce pain 2-4 points on pain scale using strategies taught in therapy  (approp and ongoing)  4. Pt will demonstrate independence with the HEP at discharge.   (approp and ongoing)  5.  Pt will be able to play his guitar for at least 30 mins without pain.  (approp and ongoing)    Plan     Continue with established Plan of Care towards established PT goals.     Therapist: Chago Henriquez, PT  9/11/2023

## 2023-11-20 ENCOUNTER — PATIENT MESSAGE (OUTPATIENT)
Dept: INTERNAL MEDICINE | Facility: CLINIC | Age: 33
End: 2023-11-20
Payer: COMMERCIAL

## 2024-01-03 ENCOUNTER — LAB VISIT (OUTPATIENT)
Dept: LAB | Facility: OTHER | Age: 34
End: 2024-01-03
Attending: INTERNAL MEDICINE
Payer: COMMERCIAL

## 2024-01-03 ENCOUNTER — OFFICE VISIT (OUTPATIENT)
Dept: INTERNAL MEDICINE | Facility: CLINIC | Age: 34
End: 2024-01-03
Attending: INTERNAL MEDICINE
Payer: COMMERCIAL

## 2024-01-03 VITALS
WEIGHT: 239 LBS | BODY MASS INDEX: 31.68 KG/M2 | SYSTOLIC BLOOD PRESSURE: 110 MMHG | DIASTOLIC BLOOD PRESSURE: 72 MMHG | OXYGEN SATURATION: 99 % | HEART RATE: 99 BPM | HEIGHT: 73 IN

## 2024-01-03 DIAGNOSIS — R79.89 OTHER SPECIFIED ABNORMAL FINDINGS OF BLOOD CHEMISTRY: ICD-10-CM

## 2024-01-03 DIAGNOSIS — D50.8 OTHER IRON DEFICIENCY ANEMIA: ICD-10-CM

## 2024-01-03 DIAGNOSIS — E66.9 OBESITY (BMI 30.0-34.9): Primary | ICD-10-CM

## 2024-01-03 DIAGNOSIS — E78.9 DISORDER OF LIPID METABOLISM: ICD-10-CM

## 2024-01-03 DIAGNOSIS — R63.4 WEIGHT LOSS OBSERVED ON EXAMINATION: ICD-10-CM

## 2024-01-03 DIAGNOSIS — Z00.00 ROUTINE ADULT HEALTH MAINTENANCE: ICD-10-CM

## 2024-01-03 DIAGNOSIS — E03.9 HYPOTHYROIDISM, UNSPECIFIED TYPE: ICD-10-CM

## 2024-01-03 DIAGNOSIS — E55.9 VITAMIN D DEFICIENCY: ICD-10-CM

## 2024-01-03 DIAGNOSIS — D51.0 PERNICIOUS ANEMIA: ICD-10-CM

## 2024-01-03 DIAGNOSIS — Z12.5 SCREENING FOR PROSTATE CANCER: ICD-10-CM

## 2024-01-03 DIAGNOSIS — Z00.00 ROUTINE ADULT HEALTH MAINTENANCE: Primary | ICD-10-CM

## 2024-01-03 LAB
25(OH)D3+25(OH)D2 SERPL-MCNC: 33 NG/ML (ref 30–96)
ALBUMIN SERPL BCP-MCNC: 4 G/DL (ref 3.5–5.2)
ALP SERPL-CCNC: 73 U/L (ref 55–135)
ALT SERPL W/O P-5'-P-CCNC: 22 U/L (ref 10–44)
ANION GAP SERPL CALC-SCNC: 8 MMOL/L (ref 8–16)
AST SERPL-CCNC: 18 U/L (ref 10–40)
BASOPHILS # BLD AUTO: 0.03 K/UL (ref 0–0.2)
BASOPHILS NFR BLD: 0.6 % (ref 0–1.9)
BILIRUB SERPL-MCNC: 0.7 MG/DL (ref 0.1–1)
BUN SERPL-MCNC: 13 MG/DL (ref 6–20)
CALCIUM SERPL-MCNC: 9.1 MG/DL (ref 8.7–10.5)
CHLORIDE SERPL-SCNC: 103 MMOL/L (ref 95–110)
CHOLEST SERPL-MCNC: 169 MG/DL (ref 120–199)
CHOLEST/HDLC SERPL: 3.6 {RATIO} (ref 2–5)
CO2 SERPL-SCNC: 28 MMOL/L (ref 23–29)
CREAT SERPL-MCNC: 1.1 MG/DL (ref 0.5–1.4)
DIFFERENTIAL METHOD BLD: ABNORMAL
EOSINOPHIL # BLD AUTO: 0.1 K/UL (ref 0–0.5)
EOSINOPHIL NFR BLD: 2 % (ref 0–8)
ERYTHROCYTE [DISTWIDTH] IN BLOOD BY AUTOMATED COUNT: 12.6 % (ref 11.5–14.5)
EST. GFR  (NO RACE VARIABLE): >60 ML/MIN/1.73 M^2
ESTIMATED AVG GLUCOSE: 91 MG/DL (ref 68–131)
GLUCOSE SERPL-MCNC: 95 MG/DL (ref 70–110)
HBA1C MFR BLD: 4.8 % (ref 4–5.6)
HCT VFR BLD AUTO: 45.2 % (ref 40–54)
HDLC SERPL-MCNC: 47 MG/DL (ref 40–75)
HDLC SERPL: 27.8 % (ref 20–50)
HGB BLD-MCNC: 15.3 G/DL (ref 14–18)
IMM GRANULOCYTES # BLD AUTO: 0.05 K/UL (ref 0–0.04)
IMM GRANULOCYTES NFR BLD AUTO: 1 % (ref 0–0.5)
LDLC SERPL CALC-MCNC: 102.2 MG/DL (ref 63–159)
LYMPHOCYTES # BLD AUTO: 1.7 K/UL (ref 1–4.8)
LYMPHOCYTES NFR BLD: 33.3 % (ref 18–48)
MCH RBC QN AUTO: 29.9 PG (ref 27–31)
MCHC RBC AUTO-ENTMCNC: 33.8 G/DL (ref 32–36)
MCV RBC AUTO: 89 FL (ref 82–98)
MONOCYTES # BLD AUTO: 0.5 K/UL (ref 0.3–1)
MONOCYTES NFR BLD: 9.3 % (ref 4–15)
NEUTROPHILS # BLD AUTO: 2.7 K/UL (ref 1.8–7.7)
NEUTROPHILS NFR BLD: 53.8 % (ref 38–73)
NONHDLC SERPL-MCNC: 122 MG/DL
NRBC BLD-RTO: 0 /100 WBC
PLATELET # BLD AUTO: 213 K/UL (ref 150–450)
PMV BLD AUTO: 10.2 FL (ref 9.2–12.9)
POTASSIUM SERPL-SCNC: 4.4 MMOL/L (ref 3.5–5.1)
PROT SERPL-MCNC: 7 G/DL (ref 6–8.4)
RBC # BLD AUTO: 5.11 M/UL (ref 4.6–6.2)
SODIUM SERPL-SCNC: 139 MMOL/L (ref 136–145)
TRIGL SERPL-MCNC: 99 MG/DL (ref 30–150)
TSH SERPL DL<=0.005 MIU/L-ACNC: 1.51 UIU/ML (ref 0.4–4)
VIT B12 SERPL-MCNC: 281 PG/ML (ref 210–950)
WBC # BLD AUTO: 5.08 K/UL (ref 3.9–12.7)

## 2024-01-03 PROCEDURE — 36415 COLL VENOUS BLD VENIPUNCTURE: CPT | Performed by: INTERNAL MEDICINE

## 2024-01-03 PROCEDURE — 85025 COMPLETE CBC W/AUTO DIFF WBC: CPT | Performed by: INTERNAL MEDICINE

## 2024-01-03 PROCEDURE — 83036 HEMOGLOBIN GLYCOSYLATED A1C: CPT | Performed by: INTERNAL MEDICINE

## 2024-01-03 PROCEDURE — 99395 PREV VISIT EST AGE 18-39: CPT | Mod: S$GLB,,, | Performed by: INTERNAL MEDICINE

## 2024-01-03 PROCEDURE — 82306 VITAMIN D 25 HYDROXY: CPT | Performed by: INTERNAL MEDICINE

## 2024-01-03 PROCEDURE — 3078F DIAST BP <80 MM HG: CPT | Mod: CPTII,S$GLB,, | Performed by: INTERNAL MEDICINE

## 2024-01-03 PROCEDURE — 80053 COMPREHEN METABOLIC PANEL: CPT | Performed by: INTERNAL MEDICINE

## 2024-01-03 PROCEDURE — 3008F BODY MASS INDEX DOCD: CPT | Mod: CPTII,S$GLB,, | Performed by: INTERNAL MEDICINE

## 2024-01-03 PROCEDURE — 84443 ASSAY THYROID STIM HORMONE: CPT | Performed by: INTERNAL MEDICINE

## 2024-01-03 PROCEDURE — 80061 LIPID PANEL: CPT | Performed by: INTERNAL MEDICINE

## 2024-01-03 PROCEDURE — 1159F MED LIST DOCD IN RCRD: CPT | Mod: CPTII,S$GLB,, | Performed by: INTERNAL MEDICINE

## 2024-01-03 PROCEDURE — 82607 VITAMIN B-12: CPT | Performed by: INTERNAL MEDICINE

## 2024-01-03 PROCEDURE — 1160F RVW MEDS BY RX/DR IN RCRD: CPT | Mod: CPTII,S$GLB,, | Performed by: INTERNAL MEDICINE

## 2024-01-03 PROCEDURE — 3074F SYST BP LT 130 MM HG: CPT | Mod: CPTII,S$GLB,, | Performed by: INTERNAL MEDICINE

## 2024-01-03 RX ORDER — PHENTERMINE HYDROCHLORIDE 37.5 MG/1
TABLET ORAL
Qty: 30 TABLET | Refills: 1 | Status: SHIPPED | OUTPATIENT
Start: 2024-01-03

## 2024-01-03 NOTE — PROGRESS NOTES
Subjective     Patient ID: Curtis Moseley is a 33 y.o. male.    Chief Complaint: Annual Exam    He has lost 2 pounds over the past 5 months.  He takes Adipex every other day.  He has lost a total of 116 pounds over the past year and half.  He would like to get excess skin removal surgery.      Review of Systems   Constitutional: Negative.    Respiratory: Negative.     Cardiovascular: Negative.           Objective     Physical Exam  Vitals and nursing note reviewed.   Constitutional:       Appearance: He is well-developed.   HENT:      Head: Normocephalic and atraumatic.   Eyes:      Pupils: Pupils are equal, round, and reactive to light.   Cardiovascular:      Rate and Rhythm: Normal rate and regular rhythm.      Heart sounds: Normal heart sounds.   Pulmonary:      Effort: Pulmonary effort is normal.   Neurological:      Mental Status: He is alert.            Assessment and Plan     1. Obesity (BMI 30.0-34.9)  Overview:  Max wt = 355 ib in 7/22    Orders:  -     Ambulatory referral/consult to Plastic Surgery; Future; Expected date: 01/04/2024    2. Weight loss observed on examination  -     Ambulatory referral/consult to Plastic Surgery; Future; Expected date: 01/04/2024    3. Routine adult health maintenance    Other orders  -     phentermine (ADIPEX-P) 37.5 mg tablet; Take 1/2-1 tablet by mouth each morning  Dispense: 30 tablet; Refill: 1        Plan:  Per orders and D/C instructions.  Continue a low carb diet, exercise, and Adipex as needed for obesity, which is remarkably improved.  Refer to plastic surgery for excess skin reduction surgery.  Check routine labs.       Follow up in about 4 months (around 5/3/2024).

## 2024-03-12 ENCOUNTER — OFFICE VISIT (OUTPATIENT)
Dept: INTERNAL MEDICINE | Facility: CLINIC | Age: 34
End: 2024-03-12
Attending: INTERNAL MEDICINE
Payer: COMMERCIAL

## 2024-03-12 VITALS
BODY MASS INDEX: 32.87 KG/M2 | SYSTOLIC BLOOD PRESSURE: 112 MMHG | OXYGEN SATURATION: 99 % | DIASTOLIC BLOOD PRESSURE: 74 MMHG | HEIGHT: 73 IN | WEIGHT: 248 LBS | HEART RATE: 64 BPM

## 2024-03-12 DIAGNOSIS — E66.9 OBESITY (BMI 30.0-34.9): ICD-10-CM

## 2024-03-12 DIAGNOSIS — E55.9 VITAMIN D DEFICIENCY: Primary | ICD-10-CM

## 2024-03-12 PROCEDURE — 1159F MED LIST DOCD IN RCRD: CPT | Mod: CPTII,S$GLB,, | Performed by: INTERNAL MEDICINE

## 2024-03-12 PROCEDURE — 3044F HG A1C LEVEL LT 7.0%: CPT | Mod: CPTII,S$GLB,, | Performed by: INTERNAL MEDICINE

## 2024-03-12 PROCEDURE — 3074F SYST BP LT 130 MM HG: CPT | Mod: CPTII,S$GLB,, | Performed by: INTERNAL MEDICINE

## 2024-03-12 PROCEDURE — 3008F BODY MASS INDEX DOCD: CPT | Mod: CPTII,S$GLB,, | Performed by: INTERNAL MEDICINE

## 2024-03-12 PROCEDURE — 99213 OFFICE O/P EST LOW 20 MIN: CPT | Mod: S$GLB,,, | Performed by: INTERNAL MEDICINE

## 2024-03-12 PROCEDURE — 3078F DIAST BP <80 MM HG: CPT | Mod: CPTII,S$GLB,, | Performed by: INTERNAL MEDICINE

## 2024-03-12 PROCEDURE — 1160F RVW MEDS BY RX/DR IN RCRD: CPT | Mod: CPTII,S$GLB,, | Performed by: INTERNAL MEDICINE

## 2024-03-12 RX ORDER — TADALAFIL 20 MG/1
TABLET ORAL
Qty: 20 TABLET | Refills: 3 | Status: SHIPPED | OUTPATIENT
Start: 2024-03-12

## 2024-03-12 NOTE — PROGRESS NOTES
Subjective     Patient ID: Curtis Moseley is a 33 y.o. male.    Chief Complaint: Follow-up (Discuss issues with libido and Testosterone levels )    He has a new girlfriend.  On 2 occasions he has had difficulty maintaining an erection.  He had taken Adipex and vaped nicotine prior.    Follow-up      Review of Systems   Constitutional: Negative.    Respiratory: Negative.     Cardiovascular: Negative.           Objective     Physical Exam  Vitals and nursing note reviewed.   Constitutional:       Appearance: He is well-developed.   HENT:      Head: Normocephalic and atraumatic.   Eyes:      Pupils: Pupils are equal, round, and reactive to light.   Cardiovascular:      Rate and Rhythm: Normal rate and regular rhythm.      Heart sounds: Normal heart sounds.   Pulmonary:      Effort: Pulmonary effort is normal.   Neurological:      Mental Status: He is alert.            Assessment and Plan     1. Vitamin D deficiency    2. Obesity (BMI 30.0-34.9)  Overview:  Max wt = 355 ib in 7/22      Other orders  -     tadalafiL (CIALIS) 20 MG Tab; Take 1/2-1 tablet every 36 hours as needed for ED  Dispense: 20 tablet; Refill: 3        Plan:  Per orders and D/C instructions.  Continue Adipex for obesity.  Continue vitamin-D for history of vitamin-D deficiency.  Cialis as needed for ED.    Between 20 and 29 min of total time for evaluation and management services were spent on the patient today.  The medical problems and treatment options were discussed, and all questions were answered.          Follow up in 7 weeks (on 5/3/2024).

## 2024-03-14 ENCOUNTER — PATIENT MESSAGE (OUTPATIENT)
Dept: INTERNAL MEDICINE | Facility: CLINIC | Age: 34
End: 2024-03-14
Payer: COMMERCIAL

## 2024-05-03 ENCOUNTER — OFFICE VISIT (OUTPATIENT)
Dept: INTERNAL MEDICINE | Facility: CLINIC | Age: 34
End: 2024-05-03
Attending: INTERNAL MEDICINE
Payer: COMMERCIAL

## 2024-05-03 VITALS
OXYGEN SATURATION: 99 % | HEIGHT: 73 IN | WEIGHT: 246 LBS | HEART RATE: 83 BPM | BODY MASS INDEX: 32.6 KG/M2 | DIASTOLIC BLOOD PRESSURE: 76 MMHG | SYSTOLIC BLOOD PRESSURE: 112 MMHG

## 2024-05-03 DIAGNOSIS — E66.9 OBESITY (BMI 30.0-34.9): Primary | ICD-10-CM

## 2024-05-03 PROCEDURE — 3074F SYST BP LT 130 MM HG: CPT | Mod: CPTII,S$GLB,, | Performed by: INTERNAL MEDICINE

## 2024-05-03 PROCEDURE — 99213 OFFICE O/P EST LOW 20 MIN: CPT | Mod: S$GLB,,, | Performed by: INTERNAL MEDICINE

## 2024-05-03 PROCEDURE — 3044F HG A1C LEVEL LT 7.0%: CPT | Mod: CPTII,S$GLB,, | Performed by: INTERNAL MEDICINE

## 2024-05-03 PROCEDURE — 1159F MED LIST DOCD IN RCRD: CPT | Mod: CPTII,S$GLB,, | Performed by: INTERNAL MEDICINE

## 2024-05-03 PROCEDURE — 1160F RVW MEDS BY RX/DR IN RCRD: CPT | Mod: CPTII,S$GLB,, | Performed by: INTERNAL MEDICINE

## 2024-05-03 PROCEDURE — 3078F DIAST BP <80 MM HG: CPT | Mod: CPTII,S$GLB,, | Performed by: INTERNAL MEDICINE

## 2024-05-03 PROCEDURE — 3008F BODY MASS INDEX DOCD: CPT | Mod: CPTII,S$GLB,, | Performed by: INTERNAL MEDICINE

## 2024-05-03 NOTE — PROGRESS NOTES
Subjective     Patient ID: Curtis Moseley is a 33 y.o. male.    Chief Complaint: Follow-up    He takes Adipex intermittently.  He is lost 2 pounds over the past 2 months.    Follow-up  This is a chronic problem. The current episode started more than 1 year ago. The problem has been unchanged.     Review of Systems   Constitutional: Negative.    Respiratory: Negative.     Cardiovascular: Negative.           Objective     Physical Exam  Vitals and nursing note reviewed.   Constitutional:       Appearance: He is well-developed.   HENT:      Head: Normocephalic and atraumatic.   Eyes:      Pupils: Pupils are equal, round, and reactive to light.   Cardiovascular:      Rate and Rhythm: Normal rate and regular rhythm.      Heart sounds: Normal heart sounds.   Pulmonary:      Effort: Pulmonary effort is normal.   Neurological:      Mental Status: He is alert.            Assessment and Plan     1. Obesity (BMI 30.0-34.9)  Overview:  Max wt = 355 ib in 7/22          Plan:  Per orders and D/C instructions.  Continue Adipex as needed for obesity, which is significantly improved.    Between 20 and 29 min of total time for evaluation and management services were spent on the patient today.  The medical problems and treatment options were discussed, and all questions were answered.          Follow up in about 4 months (around 9/3/2024).

## 2024-06-27 RX ORDER — PHENTERMINE HYDROCHLORIDE 37.5 MG/1
TABLET ORAL
Qty: 30 TABLET | Refills: 2 | Status: SHIPPED | OUTPATIENT
Start: 2024-06-27

## 2024-10-08 RX ORDER — PHENTERMINE HYDROCHLORIDE 37.5 MG/1
TABLET ORAL
Qty: 30 TABLET | Refills: 0 | Status: SHIPPED | OUTPATIENT
Start: 2024-10-08

## 2024-10-17 ENCOUNTER — OFFICE VISIT (OUTPATIENT)
Dept: INTERNAL MEDICINE | Facility: CLINIC | Age: 34
End: 2024-10-17
Attending: INTERNAL MEDICINE
Payer: COMMERCIAL

## 2024-10-17 VITALS
BODY MASS INDEX: 32.07 KG/M2 | WEIGHT: 242 LBS | SYSTOLIC BLOOD PRESSURE: 116 MMHG | HEART RATE: 80 BPM | OXYGEN SATURATION: 98 % | DIASTOLIC BLOOD PRESSURE: 64 MMHG | HEIGHT: 73 IN

## 2024-10-17 DIAGNOSIS — R09.82 POST-NASAL DRIP: ICD-10-CM

## 2024-10-17 DIAGNOSIS — R09.81 NASAL CONGESTION: ICD-10-CM

## 2024-10-17 DIAGNOSIS — R50.9 FEVER, UNSPECIFIED FEVER CAUSE: ICD-10-CM

## 2024-10-17 DIAGNOSIS — E66.811 OBESITY (BMI 30.0-34.9): Primary | ICD-10-CM

## 2024-10-17 DIAGNOSIS — J30.1 NON-SEASONAL ALLERGIC RHINITIS DUE TO POLLEN: ICD-10-CM

## 2024-10-17 NOTE — PROGRESS NOTES
Subjective     Patient ID: Curtis Moseley is a 33 y.o. male.    Chief Complaint: Follow-up (Tues started feeling bad and was getting sinus did take a Covid/Sinus test which came back Neg fever of 102 last night back to 98 this morning, glands in throat are tender )    He started feeling bad 2 days ago.  Yesterday he had a temperature of 100.7°.  The glands in his neck fill slightly swollen.  He took a COVID and flu test which were both negative.  He denies any cough or nasal discharge.  He has chronic nasal drip and nasal congestion at night.  He does not get benefit from Zyrtec or Sudafed.  He does get some relief from Flonase.    Follow-up  This is a chronic problem. The current episode started more than 1 year ago. The problem has been unchanged. Associated symptoms include congestion and a fever.     Review of Systems   Constitutional:  Positive for fever.   HENT:  Positive for nasal congestion and postnasal drip.    Respiratory: Negative.     Cardiovascular: Negative.           Objective     Physical Exam  Vitals and nursing note reviewed.   Constitutional:       Appearance: He is well-developed.   HENT:      Head: Normocephalic and atraumatic.      Mouth/Throat:      Mouth: Mucous membranes are moist.      Pharynx: Oropharynx is clear.   Eyes:      Pupils: Pupils are equal, round, and reactive to light.   Cardiovascular:      Rate and Rhythm: Normal rate and regular rhythm.      Heart sounds: Normal heart sounds.   Pulmonary:      Effort: Pulmonary effort is normal.   Neurological:      Mental Status: He is alert.            Assessment and Plan     1. Obesity (BMI 30.0-34.9)  Overview:  Max wt = 355 ib in 7/22    Orders:  -     Ambulatory referral/consult to Plastic Surgery; Future; Expected date: 10/24/2024    2. Nasal congestion  -     Ambulatory referral/consult to ENT; Future; Expected date: 10/24/2024    3. Post-nasal drip  -     Ambulatory referral/consult to ENT; Future; Expected date:  10/24/2024    4. Non-seasonal allergic rhinitis due to pollen    5. Fever, unspecified fever cause        Plan:  Per orders and D/C instructions.  Continue diet, exercise, and Adipex as needed for obesity which continues to improve  He likely has an upper respiratory viral infection.  He will continue to monitor the symptoms.  Refer to ENT for nasal congestion and chronic postnasal drip.  Defer flu shot until next week.    Between 30 and 39 min of total time for evaluation and management services were spent on the patient today.  The medical problems and treatment options were discussed, and all questions were answered.        Follow up in about 4 months (around 2/17/2025).

## 2024-10-21 ENCOUNTER — PATIENT MESSAGE (OUTPATIENT)
Dept: INTERNAL MEDICINE | Facility: CLINIC | Age: 34
End: 2024-10-21
Payer: COMMERCIAL

## 2024-10-21 DIAGNOSIS — J02.0 STREP THROAT: Primary | ICD-10-CM

## 2024-10-21 RX ORDER — AMOXICILLIN 875 MG/1
875 TABLET, FILM COATED ORAL 2 TIMES DAILY
Qty: 14 TABLET | Refills: 0 | Status: SHIPPED | OUTPATIENT
Start: 2024-10-21

## 2024-11-10 DIAGNOSIS — E66.9 OBESITY, UNSPECIFIED CLASS, UNSPECIFIED OBESITY TYPE, UNSPECIFIED WHETHER SERIOUS COMORBIDITY PRESENT: Primary | ICD-10-CM

## 2024-11-11 RX ORDER — PHENTERMINE HYDROCHLORIDE 37.5 MG/1
TABLET ORAL
Qty: 30 TABLET | Refills: 2 | Status: SHIPPED | OUTPATIENT
Start: 2024-11-11

## 2024-12-23 ENCOUNTER — OFFICE VISIT (OUTPATIENT)
Dept: INTERNAL MEDICINE | Facility: CLINIC | Age: 34
End: 2024-12-23
Attending: INTERNAL MEDICINE
Payer: COMMERCIAL

## 2024-12-23 ENCOUNTER — LAB VISIT (OUTPATIENT)
Dept: LAB | Facility: OTHER | Age: 34
End: 2024-12-23
Attending: INTERNAL MEDICINE
Payer: COMMERCIAL

## 2024-12-23 VITALS
DIASTOLIC BLOOD PRESSURE: 76 MMHG | SYSTOLIC BLOOD PRESSURE: 118 MMHG | WEIGHT: 244 LBS | HEIGHT: 73 IN | HEART RATE: 97 BPM | BODY MASS INDEX: 32.34 KG/M2 | OXYGEN SATURATION: 99 %

## 2024-12-23 DIAGNOSIS — E66.811 OBESITY (BMI 30.0-34.9): Primary | ICD-10-CM

## 2024-12-23 DIAGNOSIS — Z01.818 PRE-OP EVALUATION: ICD-10-CM

## 2024-12-23 DIAGNOSIS — E55.9 VITAMIN D DEFICIENCY: ICD-10-CM

## 2024-12-23 DIAGNOSIS — E66.811 OBESITY (BMI 30.0-34.9): ICD-10-CM

## 2024-12-23 LAB
ALBUMIN SERPL BCP-MCNC: 4.3 G/DL (ref 3.5–5.2)
ALP SERPL-CCNC: 65 U/L (ref 40–150)
ALT SERPL W/O P-5'-P-CCNC: 23 U/L (ref 10–44)
ANION GAP SERPL CALC-SCNC: 9 MMOL/L (ref 8–16)
AST SERPL-CCNC: 20 U/L (ref 10–40)
BASOPHILS # BLD AUTO: 0.03 K/UL (ref 0–0.2)
BASOPHILS NFR BLD: 0.5 % (ref 0–1.9)
BILIRUB SERPL-MCNC: 0.7 MG/DL (ref 0.1–1)
BUN SERPL-MCNC: 20 MG/DL (ref 6–20)
CALCIUM SERPL-MCNC: 9.5 MG/DL (ref 8.7–10.5)
CHLORIDE SERPL-SCNC: 105 MMOL/L (ref 95–110)
CO2 SERPL-SCNC: 27 MMOL/L (ref 23–29)
CREAT SERPL-MCNC: 1.3 MG/DL (ref 0.5–1.4)
DIFFERENTIAL METHOD BLD: NORMAL
EOSINOPHIL # BLD AUTO: 0.1 K/UL (ref 0–0.5)
EOSINOPHIL NFR BLD: 1.8 % (ref 0–8)
ERYTHROCYTE [DISTWIDTH] IN BLOOD BY AUTOMATED COUNT: 12.6 % (ref 11.5–14.5)
EST. GFR  (NO RACE VARIABLE): >60 ML/MIN/1.73 M^2
GLUCOSE SERPL-MCNC: 96 MG/DL (ref 70–110)
HCT VFR BLD AUTO: 46.9 % (ref 40–54)
HGB BLD-MCNC: 15.9 G/DL (ref 14–18)
IMM GRANULOCYTES # BLD AUTO: 0.01 K/UL (ref 0–0.04)
IMM GRANULOCYTES NFR BLD AUTO: 0.2 % (ref 0–0.5)
LYMPHOCYTES # BLD AUTO: 1.8 K/UL (ref 1–4.8)
LYMPHOCYTES NFR BLD: 32.1 % (ref 18–48)
MCH RBC QN AUTO: 30.8 PG (ref 27–31)
MCHC RBC AUTO-ENTMCNC: 33.9 G/DL (ref 32–36)
MCV RBC AUTO: 91 FL (ref 82–98)
MONOCYTES # BLD AUTO: 0.5 K/UL (ref 0.3–1)
MONOCYTES NFR BLD: 9.8 % (ref 4–15)
NEUTROPHILS # BLD AUTO: 3.1 K/UL (ref 1.8–7.7)
NEUTROPHILS NFR BLD: 55.6 % (ref 38–73)
NRBC BLD-RTO: 0 /100 WBC
PLATELET # BLD AUTO: 193 K/UL (ref 150–450)
PMV BLD AUTO: 10.3 FL (ref 9.2–12.9)
POTASSIUM SERPL-SCNC: 4.4 MMOL/L (ref 3.5–5.1)
PROT SERPL-MCNC: 6.9 G/DL (ref 6–8.4)
RBC # BLD AUTO: 5.17 M/UL (ref 4.6–6.2)
SODIUM SERPL-SCNC: 141 MMOL/L (ref 136–145)
WBC # BLD AUTO: 5.52 K/UL (ref 3.9–12.7)

## 2024-12-23 PROCEDURE — 3074F SYST BP LT 130 MM HG: CPT | Mod: CPTII,S$GLB,, | Performed by: INTERNAL MEDICINE

## 2024-12-23 PROCEDURE — 1159F MED LIST DOCD IN RCRD: CPT | Mod: CPTII,S$GLB,, | Performed by: INTERNAL MEDICINE

## 2024-12-23 PROCEDURE — 85025 COMPLETE CBC W/AUTO DIFF WBC: CPT | Performed by: INTERNAL MEDICINE

## 2024-12-23 PROCEDURE — 80053 COMPREHEN METABOLIC PANEL: CPT | Performed by: INTERNAL MEDICINE

## 2024-12-23 PROCEDURE — 3008F BODY MASS INDEX DOCD: CPT | Mod: CPTII,S$GLB,, | Performed by: INTERNAL MEDICINE

## 2024-12-23 PROCEDURE — 99214 OFFICE O/P EST MOD 30 MIN: CPT | Mod: S$GLB,,, | Performed by: INTERNAL MEDICINE

## 2024-12-23 PROCEDURE — 3078F DIAST BP <80 MM HG: CPT | Mod: CPTII,S$GLB,, | Performed by: INTERNAL MEDICINE

## 2024-12-23 PROCEDURE — 36415 COLL VENOUS BLD VENIPUNCTURE: CPT | Performed by: INTERNAL MEDICINE

## 2024-12-23 PROCEDURE — 3044F HG A1C LEVEL LT 7.0%: CPT | Mod: CPTII,S$GLB,, | Performed by: INTERNAL MEDICINE

## 2024-12-23 PROCEDURE — 1160F RVW MEDS BY RX/DR IN RCRD: CPT | Mod: CPTII,S$GLB,, | Performed by: INTERNAL MEDICINE

## 2024-12-23 NOTE — PROGRESS NOTES
Subjective     Patient ID: Curtis Moseley is a 34 y.o. male.    Chief Complaint: Pre-op Exam (Mastectomees for gynecomastia and abdominoplasty under general anesthesia with Dr Isabel De La Torre 1/13/25)    He is scheduled for bilateral mastectomy and abdominoplasty by Dr. Roman on January 13, 2025.      Review of Systems   Constitutional: Negative.    Respiratory: Negative.     Cardiovascular: Negative.           Objective     Physical Exam  Vitals and nursing note reviewed.   Constitutional:       Appearance: He is well-developed.   HENT:      Head: Normocephalic and atraumatic.      Mouth/Throat:      Mouth: Mucous membranes are moist.      Pharynx: Oropharynx is clear.   Eyes:      Pupils: Pupils are equal, round, and reactive to light.   Cardiovascular:      Rate and Rhythm: Normal rate and regular rhythm.      Heart sounds: Normal heart sounds.   Pulmonary:      Effort: Pulmonary effort is normal.   Neurological:      Mental Status: He is alert.            Assessment and Plan     1. Obesity (BMI 30.0-34.9)  Overview:  Max wt = 355 ib in 7/22      2. Vitamin D deficiency    3. Pre-op evaluation        Plan:  Per orders and D/C instructions.  He will stay off the Adipex until after his surgery.  He will continue a low-calorie diet for his obesity.  Continue vitamin-D for history of vitamin-D deficiency.  Check labs today.  Preop form of medical clearance will follow.    Between 30 and 39 min of total time for evaluation and management services were spent on the patient today.  The medical problems and treatment options were discussed, and all questions were answered.         Follow up in 8 weeks (on 2/17/2025).

## 2024-12-26 ENCOUNTER — PATIENT MESSAGE (OUTPATIENT)
Dept: INTERNAL MEDICINE | Facility: CLINIC | Age: 34
End: 2024-12-26
Payer: COMMERCIAL

## 2025-02-17 ENCOUNTER — LAB VISIT (OUTPATIENT)
Dept: LAB | Facility: OTHER | Age: 35
End: 2025-02-17
Attending: INTERNAL MEDICINE
Payer: COMMERCIAL

## 2025-02-17 ENCOUNTER — OFFICE VISIT (OUTPATIENT)
Dept: INTERNAL MEDICINE | Facility: CLINIC | Age: 35
End: 2025-02-17
Attending: INTERNAL MEDICINE
Payer: COMMERCIAL

## 2025-02-17 VITALS
HEIGHT: 73 IN | OXYGEN SATURATION: 99 % | HEART RATE: 98 BPM | DIASTOLIC BLOOD PRESSURE: 70 MMHG | BODY MASS INDEX: 33 KG/M2 | WEIGHT: 249 LBS | SYSTOLIC BLOOD PRESSURE: 116 MMHG

## 2025-02-17 DIAGNOSIS — Z12.5 SCREENING FOR PROSTATE CANCER: ICD-10-CM

## 2025-02-17 DIAGNOSIS — E55.9 VITAMIN D DEFICIENCY: ICD-10-CM

## 2025-02-17 DIAGNOSIS — R79.89 OTHER SPECIFIED ABNORMAL FINDINGS OF BLOOD CHEMISTRY: ICD-10-CM

## 2025-02-17 DIAGNOSIS — Z00.00 ROUTINE ADULT HEALTH MAINTENANCE: Primary | ICD-10-CM

## 2025-02-17 DIAGNOSIS — D51.0 PERNICIOUS ANEMIA: ICD-10-CM

## 2025-02-17 DIAGNOSIS — E03.9 HYPOTHYROIDISM, UNSPECIFIED TYPE: ICD-10-CM

## 2025-02-17 DIAGNOSIS — D50.8 OTHER IRON DEFICIENCY ANEMIA: ICD-10-CM

## 2025-02-17 DIAGNOSIS — E66.9 OBESITY, UNSPECIFIED CLASS, UNSPECIFIED OBESITY TYPE, UNSPECIFIED WHETHER SERIOUS COMORBIDITY PRESENT: ICD-10-CM

## 2025-02-17 DIAGNOSIS — E78.9 DISORDER OF LIPID METABOLISM: ICD-10-CM

## 2025-02-17 DIAGNOSIS — Z00.00 ROUTINE ADULT HEALTH MAINTENANCE: ICD-10-CM

## 2025-02-17 LAB
25(OH)D3+25(OH)D2 SERPL-MCNC: 44 NG/ML (ref 30–96)
ALBUMIN SERPL BCP-MCNC: 3.9 G/DL (ref 3.5–5.2)
ALP SERPL-CCNC: 62 U/L (ref 40–150)
ALT SERPL W/O P-5'-P-CCNC: 20 U/L (ref 10–44)
ANION GAP SERPL CALC-SCNC: 8 MMOL/L (ref 8–16)
AST SERPL-CCNC: 21 U/L (ref 10–40)
BASOPHILS # BLD AUTO: 0.02 K/UL (ref 0–0.2)
BASOPHILS NFR BLD: 0.4 % (ref 0–1.9)
BILIRUB SERPL-MCNC: 0.4 MG/DL (ref 0.1–1)
BUN SERPL-MCNC: 20 MG/DL (ref 6–20)
CALCIUM SERPL-MCNC: 8.9 MG/DL (ref 8.7–10.5)
CHLORIDE SERPL-SCNC: 106 MMOL/L (ref 95–110)
CHOLEST SERPL-MCNC: 169 MG/DL (ref 120–199)
CHOLEST/HDLC SERPL: 3.4 {RATIO} (ref 2–5)
CO2 SERPL-SCNC: 25 MMOL/L (ref 23–29)
CREAT SERPL-MCNC: 1.5 MG/DL (ref 0.5–1.4)
DIFFERENTIAL METHOD BLD: ABNORMAL
EOSINOPHIL # BLD AUTO: 0.1 K/UL (ref 0–0.5)
EOSINOPHIL NFR BLD: 1.7 % (ref 0–8)
ERYTHROCYTE [DISTWIDTH] IN BLOOD BY AUTOMATED COUNT: 12.4 % (ref 11.5–14.5)
EST. GFR  (NO RACE VARIABLE): >60 ML/MIN/1.73 M^2
ESTIMATED AVG GLUCOSE: 88 MG/DL (ref 68–131)
GLUCOSE SERPL-MCNC: 94 MG/DL (ref 70–110)
HBA1C MFR BLD: 4.7 % (ref 4–5.6)
HCT VFR BLD AUTO: 38.7 % (ref 40–54)
HCV AB SERPL QL IA: NEGATIVE
HDLC SERPL-MCNC: 50 MG/DL (ref 40–75)
HDLC SERPL: 29.6 % (ref 20–50)
HGB BLD-MCNC: 13.6 G/DL (ref 14–18)
HIV 1+2 AB+HIV1 P24 AG SERPL QL IA: NEGATIVE
IMM GRANULOCYTES # BLD AUTO: 0.01 K/UL (ref 0–0.04)
IMM GRANULOCYTES NFR BLD AUTO: 0.2 % (ref 0–0.5)
LDLC SERPL CALC-MCNC: 98.2 MG/DL (ref 63–159)
LYMPHOCYTES # BLD AUTO: 1.3 K/UL (ref 1–4.8)
LYMPHOCYTES NFR BLD: 25.2 % (ref 18–48)
MCH RBC QN AUTO: 30.6 PG (ref 27–31)
MCHC RBC AUTO-ENTMCNC: 35.1 G/DL (ref 32–36)
MCV RBC AUTO: 87 FL (ref 82–98)
MONOCYTES # BLD AUTO: 0.5 K/UL (ref 0.3–1)
MONOCYTES NFR BLD: 8.8 % (ref 4–15)
NEUTROPHILS # BLD AUTO: 3.3 K/UL (ref 1.8–7.7)
NEUTROPHILS NFR BLD: 63.7 % (ref 38–73)
NONHDLC SERPL-MCNC: 119 MG/DL
NRBC BLD-RTO: 0 /100 WBC
PLATELET # BLD AUTO: 220 K/UL (ref 150–450)
PMV BLD AUTO: 10.4 FL (ref 9.2–12.9)
POTASSIUM SERPL-SCNC: 4.2 MMOL/L (ref 3.5–5.1)
PROT SERPL-MCNC: 7.3 G/DL (ref 6–8.4)
RBC # BLD AUTO: 4.44 M/UL (ref 4.6–6.2)
SODIUM SERPL-SCNC: 139 MMOL/L (ref 136–145)
TREPONEMA PALLIDUM IGG+IGM AB [PRESENCE] IN SERUM OR PLASMA BY IMMUNOASSAY: NONREACTIVE
TRIGL SERPL-MCNC: 104 MG/DL (ref 30–150)
TSH SERPL DL<=0.005 MIU/L-ACNC: 0.9 UIU/ML (ref 0.4–4)
VIT B12 SERPL-MCNC: 213 PG/ML (ref 210–950)
WBC # BLD AUTO: 5.2 K/UL (ref 3.9–12.7)

## 2025-02-17 PROCEDURE — 85025 COMPLETE CBC W/AUTO DIFF WBC: CPT | Performed by: INTERNAL MEDICINE

## 2025-02-17 PROCEDURE — 87389 HIV-1 AG W/HIV-1&-2 AB AG IA: CPT | Performed by: INTERNAL MEDICINE

## 2025-02-17 PROCEDURE — 80053 COMPREHEN METABOLIC PANEL: CPT | Performed by: INTERNAL MEDICINE

## 2025-02-17 PROCEDURE — 82607 VITAMIN B-12: CPT | Performed by: INTERNAL MEDICINE

## 2025-02-17 PROCEDURE — 83036 HEMOGLOBIN GLYCOSYLATED A1C: CPT | Performed by: INTERNAL MEDICINE

## 2025-02-17 PROCEDURE — 82306 VITAMIN D 25 HYDROXY: CPT | Performed by: INTERNAL MEDICINE

## 2025-02-17 PROCEDURE — 84443 ASSAY THYROID STIM HORMONE: CPT | Performed by: INTERNAL MEDICINE

## 2025-02-17 PROCEDURE — 80061 LIPID PANEL: CPT | Performed by: INTERNAL MEDICINE

## 2025-02-17 PROCEDURE — 86803 HEPATITIS C AB TEST: CPT | Performed by: INTERNAL MEDICINE

## 2025-02-17 PROCEDURE — 86593 SYPHILIS TEST NON-TREP QUANT: CPT | Performed by: INTERNAL MEDICINE

## 2025-02-17 PROCEDURE — 36415 COLL VENOUS BLD VENIPUNCTURE: CPT | Performed by: INTERNAL MEDICINE

## 2025-02-17 RX ORDER — PHENTERMINE HYDROCHLORIDE 37.5 MG/1
TABLET ORAL
Qty: 30 TABLET | Refills: 1 | Status: SHIPPED | OUTPATIENT
Start: 2025-02-17

## 2025-02-17 NOTE — PROGRESS NOTES
Subjective     Patient ID: Curtis Moseley is a 34 y.o. male.    Chief Complaint: Annual Exam    He had bilateral mammoplasty, skin reduction, and liposuction approximately 1 month ago.  He is able to start exercise again in 1 week.        Adult Wellness Exam:    Mental Conditions: None  Depression Risk Factors: None  BMI: See Vital signs   Colon screen:    See Health Maintenance Report                                     Vaccines (Flu, Adacel, Shingrix): See Health Maintenance Report  Routine labs (Cholesterol, Glucose/Hgb A1C, and TSH): ordered.     The patient's current health status is: Good   Patient was educated on routine health maintenance. See Patient Instructions.                               Review of Systems       Objective     Physical Exam  Vitals and nursing note reviewed.   Constitutional:       Appearance: He is well-developed.   HENT:      Head: Normocephalic and atraumatic.      Mouth/Throat:      Mouth: Mucous membranes are moist.      Pharynx: Oropharynx is clear.   Eyes:      Pupils: Pupils are equal, round, and reactive to light.   Cardiovascular:      Rate and Rhythm: Normal rate and regular rhythm.      Heart sounds: Normal heart sounds.   Pulmonary:      Effort: Pulmonary effort is normal.   Skin:            Comments: Scars healing well.   Neurological:      Mental Status: He is alert.            Assessment and Plan     1. Routine adult health maintenance    2. Obesity, unspecified class, unspecified obesity type, unspecified whether serious comorbidity present  -     phentermine (ADIPEX-P) 37.5 mg tablet; Take 1/2-1 tablet each morning as needed for weight loss.  Dispense: 30 tablet; Refill: 1        Plan:  Per orders and D/C instructions.  Continue diet, restart exercise next week, and Adipex as needed for obesity.  Check routine labs with STD labs per patient request.       Follow up in about 6 months (around 8/17/2025).

## 2025-02-18 ENCOUNTER — RESULTS FOLLOW-UP (OUTPATIENT)
Dept: INTERNAL MEDICINE | Facility: CLINIC | Age: 35
End: 2025-02-18

## 2025-03-22 RX ORDER — TADALAFIL 20 MG/1
TABLET ORAL
Qty: 20 TABLET | Refills: 3 | Status: SHIPPED | OUTPATIENT
Start: 2025-03-22

## 2025-04-15 ENCOUNTER — PATIENT MESSAGE (OUTPATIENT)
Dept: INTERNAL MEDICINE | Facility: CLINIC | Age: 35
End: 2025-04-15
Payer: COMMERCIAL

## 2025-04-15 DIAGNOSIS — F90.0 ADHD (ATTENTION DEFICIT HYPERACTIVITY DISORDER), INATTENTIVE TYPE: Primary | ICD-10-CM

## 2025-04-29 ENCOUNTER — PATIENT MESSAGE (OUTPATIENT)
Dept: PSYCHIATRY | Facility: CLINIC | Age: 35
End: 2025-04-29
Payer: COMMERCIAL

## 2025-04-30 ENCOUNTER — PATIENT MESSAGE (OUTPATIENT)
Dept: PSYCHIATRY | Facility: CLINIC | Age: 35
End: 2025-04-30
Payer: COMMERCIAL

## 2025-05-06 ENCOUNTER — CLINICAL SUPPORT (OUTPATIENT)
Dept: PSYCHIATRY | Facility: CLINIC | Age: 35
End: 2025-05-06
Payer: COMMERCIAL

## 2025-05-06 DIAGNOSIS — Z71.9 ENCOUNTER FOR EDUCATION: Primary | ICD-10-CM

## 2025-05-06 NOTE — PROGRESS NOTES
Memory and Focus 101 Psychoeducational Course (Clinical Psychology Resident)     Session 1     Site: Telemedicine     The patient location is: Telemedicine  The chief complaint leading to consultation is: Memory and Focus 101 Psychoeducational Course     Visit type: audiovisual     Face to Face time with patient: 60  75 minutes of total time spent on the encounter, which includes face to face time and non-face to face time preparing to see the patient (eg, review of tests), Obtaining and/or reviewing separately obtained history, Documenting clinical information in the electronic or other health record, Independently interpreting results (not separately reported) and communicating results to the patient/family/caregiver, or Care coordination (not separately reported).      Each patient to whom he or she provides medical services by telemedicine is:  (1) informed of the relationship between the physician and patient and the respective role of any other health care provider with respect to management of the patient; and (2) notified that he or she may decline to receive medical services by telemedicine and may withdraw from such care at any time.     Date: 5/6/2025      Course Focus: Memory and Focus 101     Length of service: 60 minutes     Number of participants in attendance: 4     Referred by: Self-referred      Target symptoms: Memory and Focus     Participant response: Active Listening, Self-Disclosure     Progress toward goals: Progressing adequately     Interval History: Discussion of session #1: Introduction to Brain Health.   What is cognitive health and what interferes with it.  What are some cognitive complaints.  What is neuroplasticity and why it is important.  Six pillars of brain health     Diagnosis:       ICD-10-CM ICD-9-CM   1. Encounter for education  Z71.9 V65.40         Plan: Continue Memory and Focus 101 Psychoeducational Course. Next meeting on 5/13/2025.      MS Eva Goldberg  LAUREN Johnson  Clinical Psychology Doctoral Interns  Supervisor: Dr. Tammy Brower, Ph.D.

## 2025-06-23 ENCOUNTER — OFFICE VISIT (OUTPATIENT)
Dept: INTERNAL MEDICINE | Facility: CLINIC | Age: 35
End: 2025-06-23
Attending: INTERNAL MEDICINE
Payer: COMMERCIAL

## 2025-06-23 VITALS
BODY MASS INDEX: 33.38 KG/M2 | OXYGEN SATURATION: 99 % | HEART RATE: 95 BPM | DIASTOLIC BLOOD PRESSURE: 70 MMHG | WEIGHT: 253 LBS | SYSTOLIC BLOOD PRESSURE: 116 MMHG

## 2025-06-23 DIAGNOSIS — E66.811 OBESITY (BMI 30.0-34.9): Primary | ICD-10-CM

## 2025-06-23 PROCEDURE — 3078F DIAST BP <80 MM HG: CPT | Mod: CPTII,S$GLB,, | Performed by: INTERNAL MEDICINE

## 2025-06-23 PROCEDURE — 1159F MED LIST DOCD IN RCRD: CPT | Mod: CPTII,S$GLB,, | Performed by: INTERNAL MEDICINE

## 2025-06-23 PROCEDURE — 99213 OFFICE O/P EST LOW 20 MIN: CPT | Mod: S$GLB,,, | Performed by: INTERNAL MEDICINE

## 2025-06-23 PROCEDURE — 3044F HG A1C LEVEL LT 7.0%: CPT | Mod: CPTII,S$GLB,, | Performed by: INTERNAL MEDICINE

## 2025-06-23 PROCEDURE — 1160F RVW MEDS BY RX/DR IN RCRD: CPT | Mod: CPTII,S$GLB,, | Performed by: INTERNAL MEDICINE

## 2025-06-23 PROCEDURE — 3008F BODY MASS INDEX DOCD: CPT | Mod: CPTII,S$GLB,, | Performed by: INTERNAL MEDICINE

## 2025-06-23 PROCEDURE — 3074F SYST BP LT 130 MM HG: CPT | Mod: CPTII,S$GLB,, | Performed by: INTERNAL MEDICINE

## 2025-06-23 RX ORDER — VIT C/E/ZN/COPPR/LUTEIN/ZEAXAN 250MG-90MG
500 CAPSULE ORAL DAILY
COMMUNITY

## 2025-06-23 NOTE — PATIENT INSTRUCTIONS
"Make an appointment with the [facility] medication therapy management team in order to get Zepbound or Wegovy prescribed for you.      Log in to your [facility] account and select "connect with [facility],"  Then select "follow-up with my care team,"  Then choose "medication therapy management" from the list of providers.  Follow the directions to make an appointment with the medication therapy management team.    You can also call [phone] to make an appointment with the medication therapy management team.   "

## 2025-06-23 NOTE — PROGRESS NOTES
Subjective     Patient ID: Curtis Moseley is a 34 y.o. male.    Chief Complaint: Follow-up    He has gained back 4 pounds over the past 4 months.  He has gained back 14 pounds over the past year and half.  He is still lost over 100 pounds.  He has not been taking the Adipex recently.  It does give him some difficulty emptying his bladder.  He admits that he could tighten up his diet and improve his exercise    Follow-up  This is a chronic problem. The current episode started more than 1 year ago. The problem has been unchanged.     Review of Systems   Constitutional: Negative.    Respiratory: Negative.     Cardiovascular: Negative.           Objective     Physical Exam  Vitals and nursing note reviewed.   Constitutional:       Appearance: He is well-developed.   HENT:      Head: Normocephalic and atraumatic.   Eyes:      Pupils: Pupils are equal, round, and reactive to light.   Cardiovascular:      Rate and Rhythm: Normal rate and regular rhythm.      Heart sounds: Normal heart sounds.   Pulmonary:      Effort: Pulmonary effort is normal.   Neurological:      Mental Status: He is alert.            Assessment and Plan     1. Obesity (BMI 30.0-34.9)  Overview:  Max wt = 355 ib in 7/22          Plan:  Per orders and D/C instructions.  He will continue a weight loss diet and exercise for his obesity.  He will consider enrolling an [facility] is a medical management program for weight loss.  I have given him the information to enrolled today.  We briefly discussed the use of Wegovy or Zepbound to help with weight loss.    Between 20 and 29 min of total time for evaluation and management services were spent on the patient today.  The medical problems and treatment options were discussed, and all questions were answered.          Follow up in about 4 months (around 10/23/2025).

## 2025-08-07 ENCOUNTER — OFFICE VISIT (OUTPATIENT)
Dept: OPTOMETRY | Facility: CLINIC | Age: 35
End: 2025-08-07
Payer: COMMERCIAL

## 2025-08-07 DIAGNOSIS — Z01.00 ROUTINE EYE EXAM: Primary | ICD-10-CM

## 2025-08-07 DIAGNOSIS — Z97.3 WEARS CONTACT LENSES: ICD-10-CM

## 2025-08-07 DIAGNOSIS — H52.13 MYOPIA OF BOTH EYES WITH REGULAR ASTIGMATISM: ICD-10-CM

## 2025-08-07 DIAGNOSIS — Z46.0 FITTING AND ADJUSTMENT OF SPECTACLES AND CONTACT LENSES: Primary | ICD-10-CM

## 2025-08-07 DIAGNOSIS — H52.223 MYOPIA OF BOTH EYES WITH REGULAR ASTIGMATISM: ICD-10-CM

## 2025-08-07 PROCEDURE — 99499 UNLISTED E&M SERVICE: CPT | Mod: ,,, | Performed by: OPTOMETRIST

## 2025-08-07 PROCEDURE — 92015 DETERMINE REFRACTIVE STATE: CPT | Mod: S$GLB,,, | Performed by: OPTOMETRIST

## 2025-08-07 PROCEDURE — 3044F HG A1C LEVEL LT 7.0%: CPT | Mod: CPTII,S$GLB,, | Performed by: OPTOMETRIST

## 2025-08-07 PROCEDURE — 92014 COMPRE OPH EXAM EST PT 1/>: CPT | Mod: S$GLB,,, | Performed by: OPTOMETRIST

## 2025-08-18 ENCOUNTER — CLINICAL SUPPORT (OUTPATIENT)
Dept: PSYCHIATRY | Facility: CLINIC | Age: 35
End: 2025-08-18
Payer: COMMERCIAL

## 2025-08-18 DIAGNOSIS — Z13.39 ADHD (ATTENTION DEFICIT HYPERACTIVITY DISORDER) EVALUATION: Primary | ICD-10-CM

## 2025-08-18 PROCEDURE — 99499 UNLISTED E&M SERVICE: CPT | Mod: ,,,

## 2025-08-19 ENCOUNTER — PATIENT MESSAGE (OUTPATIENT)
Dept: PSYCHIATRY | Facility: CLINIC | Age: 35
End: 2025-08-19
Payer: COMMERCIAL

## 2025-08-27 ENCOUNTER — PATIENT MESSAGE (OUTPATIENT)
Dept: OPTOMETRY | Facility: CLINIC | Age: 35
End: 2025-08-27
Payer: COMMERCIAL